# Patient Record
Sex: MALE | Race: WHITE | NOT HISPANIC OR LATINO | Employment: FULL TIME | URBAN - METROPOLITAN AREA
[De-identification: names, ages, dates, MRNs, and addresses within clinical notes are randomized per-mention and may not be internally consistent; named-entity substitution may affect disease eponyms.]

---

## 2017-08-14 ENCOUNTER — OFFICE VISIT (OUTPATIENT)
Dept: URGENT CARE | Facility: PHYSICIAN GROUP | Age: 47
End: 2017-08-14

## 2017-08-14 VITALS
OXYGEN SATURATION: 99 % | DIASTOLIC BLOOD PRESSURE: 86 MMHG | WEIGHT: 240 LBS | HEART RATE: 60 BPM | BODY MASS INDEX: 29.22 KG/M2 | SYSTOLIC BLOOD PRESSURE: 132 MMHG | RESPIRATION RATE: 16 BRPM | HEIGHT: 76 IN | TEMPERATURE: 98.2 F

## 2017-08-14 DIAGNOSIS — Z02.4 ENCOUNTER FOR CDL (COMMERCIAL DRIVING LICENSE) EXAM: ICD-10-CM

## 2017-08-14 PROCEDURE — 7100 PR DOT PHYSICAL: Performed by: PHYSICIAN ASSISTANT

## 2017-08-14 NOTE — PROGRESS NOTES
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   Pt. Passed HTN, urine, BP, and hearing. Currently passes examination. Please see scanned document.   Passed for 2 years .

## 2018-06-09 ENCOUNTER — APPOINTMENT (OUTPATIENT)
Dept: RADIOLOGY | Facility: MEDICAL CENTER | Age: 48
End: 2018-06-09
Attending: EMERGENCY MEDICINE
Payer: COMMERCIAL

## 2018-06-09 ENCOUNTER — HOSPITAL ENCOUNTER (EMERGENCY)
Facility: MEDICAL CENTER | Age: 48
End: 2018-06-09
Attending: EMERGENCY MEDICINE
Payer: COMMERCIAL

## 2018-06-09 ENCOUNTER — OFFICE VISIT (OUTPATIENT)
Dept: URGENT CARE | Facility: CLINIC | Age: 48
End: 2018-06-09
Payer: COMMERCIAL

## 2018-06-09 VITALS
BODY MASS INDEX: 28.43 KG/M2 | HEART RATE: 89 BPM | OXYGEN SATURATION: 91 % | RESPIRATION RATE: 16 BRPM | SYSTOLIC BLOOD PRESSURE: 130 MMHG | HEIGHT: 77 IN | TEMPERATURE: 102.1 F | DIASTOLIC BLOOD PRESSURE: 79 MMHG | WEIGHT: 240.74 LBS

## 2018-06-09 VITALS
HEIGHT: 75 IN | WEIGHT: 240.4 LBS | TEMPERATURE: 98.8 F | HEART RATE: 78 BPM | SYSTOLIC BLOOD PRESSURE: 120 MMHG | RESPIRATION RATE: 18 BRPM | OXYGEN SATURATION: 95 % | DIASTOLIC BLOOD PRESSURE: 78 MMHG | BODY MASS INDEX: 29.89 KG/M2

## 2018-06-09 DIAGNOSIS — J36 PERITONSILLAR ABSCESS: ICD-10-CM

## 2018-06-09 LAB
ALBUMIN SERPL BCP-MCNC: 4.3 G/DL (ref 3.2–4.9)
ALBUMIN/GLOB SERPL: 1.3 G/DL
ALP SERPL-CCNC: 62 U/L (ref 30–99)
ALT SERPL-CCNC: 33 U/L (ref 2–50)
ANION GAP SERPL CALC-SCNC: 8 MMOL/L (ref 0–11.9)
APTT PPP: 33 SEC (ref 24.7–36)
AST SERPL-CCNC: 28 U/L (ref 12–45)
BASOPHILS # BLD AUTO: 0.3 % (ref 0–1.8)
BASOPHILS # BLD: 0.03 K/UL (ref 0–0.12)
BILIRUB SERPL-MCNC: 0.9 MG/DL (ref 0.1–1.5)
BUN SERPL-MCNC: 14 MG/DL (ref 8–22)
CALCIUM SERPL-MCNC: 9.3 MG/DL (ref 8.5–10.5)
CHLORIDE SERPL-SCNC: 103 MMOL/L (ref 96–112)
CO2 SERPL-SCNC: 24 MMOL/L (ref 20–33)
CREAT SERPL-MCNC: 1.1 MG/DL (ref 0.5–1.4)
EOSINOPHIL # BLD AUTO: 0.01 K/UL (ref 0–0.51)
EOSINOPHIL NFR BLD: 0.1 % (ref 0–6.9)
ERYTHROCYTE [DISTWIDTH] IN BLOOD BY AUTOMATED COUNT: 40.1 FL (ref 35.9–50)
GLOBULIN SER CALC-MCNC: 3.2 G/DL (ref 1.9–3.5)
GLUCOSE SERPL-MCNC: 115 MG/DL (ref 65–99)
HCT VFR BLD AUTO: 40.7 % (ref 42–52)
HGB BLD-MCNC: 13.8 G/DL (ref 14–18)
IMM GRANULOCYTES # BLD AUTO: 0.04 K/UL (ref 0–0.11)
IMM GRANULOCYTES NFR BLD AUTO: 0.4 % (ref 0–0.9)
INR PPP: 1.12 (ref 0.87–1.13)
INT CON NEG: NEGATIVE
INT CON POS: POSITIVE
LACTATE BLD-SCNC: 0.7 MMOL/L (ref 0.5–2)
LYMPHOCYTES # BLD AUTO: 0.88 K/UL (ref 1–4.8)
LYMPHOCYTES NFR BLD: 7.8 % (ref 22–41)
MCH RBC QN AUTO: 29.6 PG (ref 27–33)
MCHC RBC AUTO-ENTMCNC: 33.9 G/DL (ref 33.7–35.3)
MCV RBC AUTO: 87.3 FL (ref 81.4–97.8)
MONOCYTES # BLD AUTO: 1.03 K/UL (ref 0–0.85)
MONOCYTES NFR BLD AUTO: 9.1 % (ref 0–13.4)
NEUTROPHILS # BLD AUTO: 9.28 K/UL (ref 1.82–7.42)
NEUTROPHILS NFR BLD: 82.3 % (ref 44–72)
NRBC # BLD AUTO: 0 K/UL
NRBC BLD-RTO: 0 /100 WBC
PLATELET # BLD AUTO: 188 K/UL (ref 164–446)
PMV BLD AUTO: 9.8 FL (ref 9–12.9)
POTASSIUM SERPL-SCNC: 3.8 MMOL/L (ref 3.6–5.5)
PROT SERPL-MCNC: 7.5 G/DL (ref 6–8.2)
PROTHROMBIN TIME: 14.1 SEC (ref 12–14.6)
RBC # BLD AUTO: 4.66 M/UL (ref 4.7–6.1)
S PYO AG THROAT QL: NORMAL
SODIUM SERPL-SCNC: 135 MMOL/L (ref 135–145)
WBC # BLD AUTO: 11.3 K/UL (ref 4.8–10.8)

## 2018-06-09 PROCEDURE — A9270 NON-COVERED ITEM OR SERVICE: HCPCS | Performed by: EMERGENCY MEDICINE

## 2018-06-09 PROCEDURE — 700111 HCHG RX REV CODE 636 W/ 250 OVERRIDE (IP): Performed by: EMERGENCY MEDICINE

## 2018-06-09 PROCEDURE — 85610 PROTHROMBIN TIME: CPT

## 2018-06-09 PROCEDURE — 80053 COMPREHEN METABOLIC PANEL: CPT

## 2018-06-09 PROCEDURE — 700117 HCHG RX CONTRAST REV CODE 255: Performed by: EMERGENCY MEDICINE

## 2018-06-09 PROCEDURE — 96365 THER/PROPH/DIAG IV INF INIT: CPT

## 2018-06-09 PROCEDURE — 303977 HCHG I & D

## 2018-06-09 PROCEDURE — 96375 TX/PRO/DX INJ NEW DRUG ADDON: CPT

## 2018-06-09 PROCEDURE — 87880 STREP A ASSAY W/OPTIC: CPT | Performed by: PHYSICIAN ASSISTANT

## 2018-06-09 PROCEDURE — 700101 HCHG RX REV CODE 250: Performed by: EMERGENCY MEDICINE

## 2018-06-09 PROCEDURE — 99222 1ST HOSP IP/OBS MODERATE 55: CPT | Performed by: HOSPITALIST

## 2018-06-09 PROCEDURE — 700105 HCHG RX REV CODE 258: Performed by: EMERGENCY MEDICINE

## 2018-06-09 PROCEDURE — 99203 OFFICE O/P NEW LOW 30 MIN: CPT | Performed by: PHYSICIAN ASSISTANT

## 2018-06-09 PROCEDURE — 304561 HCHG STAT O2

## 2018-06-09 PROCEDURE — 700102 HCHG RX REV CODE 250 W/ 637 OVERRIDE(OP): Performed by: EMERGENCY MEDICINE

## 2018-06-09 PROCEDURE — 70491 CT SOFT TISSUE NECK W/DYE: CPT

## 2018-06-09 PROCEDURE — 85730 THROMBOPLASTIN TIME PARTIAL: CPT

## 2018-06-09 PROCEDURE — 83605 ASSAY OF LACTIC ACID: CPT

## 2018-06-09 PROCEDURE — 85025 COMPLETE CBC W/AUTO DIFF WBC: CPT

## 2018-06-09 PROCEDURE — 99285 EMERGENCY DEPT VISIT HI MDM: CPT

## 2018-06-09 RX ORDER — AMOXICILLIN 250 MG
2 CAPSULE ORAL 2 TIMES DAILY
Status: DISCONTINUED | OUTPATIENT
Start: 2018-06-09 | End: 2018-06-09 | Stop reason: HOSPADM

## 2018-06-09 RX ORDER — LIDOCAINE HYDROCHLORIDE AND EPINEPHRINE 10; 10 MG/ML; UG/ML
10 INJECTION, SOLUTION INFILTRATION; PERINEURAL ONCE
Status: COMPLETED | OUTPATIENT
Start: 2018-06-09 | End: 2018-06-09

## 2018-06-09 RX ORDER — ONDANSETRON 4 MG/1
4 TABLET, ORALLY DISINTEGRATING ORAL EVERY 4 HOURS PRN
Status: DISCONTINUED | OUTPATIENT
Start: 2018-06-09 | End: 2018-06-09 | Stop reason: HOSPADM

## 2018-06-09 RX ORDER — PROMETHAZINE HYDROCHLORIDE 25 MG/1
12.5-25 TABLET ORAL EVERY 4 HOURS PRN
Status: DISCONTINUED | OUTPATIENT
Start: 2018-06-09 | End: 2018-06-09 | Stop reason: HOSPADM

## 2018-06-09 RX ORDER — HYDROCODONE BITARTRATE AND ACETAMINOPHEN 5; 325 MG/1; MG/1
1-2 TABLET ORAL EVERY 6 HOURS PRN
Qty: 15 TAB | Refills: 0 | Status: SHIPPED | OUTPATIENT
Start: 2018-06-09 | End: 2018-06-12

## 2018-06-09 RX ORDER — BISACODYL 10 MG
10 SUPPOSITORY, RECTAL RECTAL
Status: DISCONTINUED | OUTPATIENT
Start: 2018-06-09 | End: 2018-06-09 | Stop reason: HOSPADM

## 2018-06-09 RX ORDER — ACETAMINOPHEN 325 MG/1
650 TABLET ORAL ONCE
Status: COMPLETED | OUTPATIENT
Start: 2018-06-09 | End: 2018-06-09

## 2018-06-09 RX ORDER — AMOXICILLIN AND CLAVULANATE POTASSIUM 875; 125 MG/1; MG/1
1 TABLET, FILM COATED ORAL 2 TIMES DAILY
Qty: 20 TAB | Refills: 0 | Status: SHIPPED | OUTPATIENT
Start: 2018-06-09 | End: 2018-06-19

## 2018-06-09 RX ORDER — SODIUM CHLORIDE AND POTASSIUM CHLORIDE 150; 900 MG/100ML; MG/100ML
INJECTION, SOLUTION INTRAVENOUS CONTINUOUS
Status: DISCONTINUED | OUTPATIENT
Start: 2018-06-09 | End: 2018-06-09 | Stop reason: HOSPADM

## 2018-06-09 RX ORDER — POLYETHYLENE GLYCOL 3350 17 G/17G
1 POWDER, FOR SOLUTION ORAL
Status: DISCONTINUED | OUTPATIENT
Start: 2018-06-09 | End: 2018-06-09 | Stop reason: HOSPADM

## 2018-06-09 RX ORDER — DEXAMETHASONE SODIUM PHOSPHATE 10 MG/ML
10 INJECTION, SOLUTION INTRAMUSCULAR; INTRAVENOUS ONCE
Status: COMPLETED | OUTPATIENT
Start: 2018-06-09 | End: 2018-06-09

## 2018-06-09 RX ORDER — HYDROMORPHONE HYDROCHLORIDE 2 MG/ML
1 INJECTION, SOLUTION INTRAMUSCULAR; INTRAVENOUS; SUBCUTANEOUS
Status: DISCONTINUED | OUTPATIENT
Start: 2018-06-09 | End: 2018-06-09 | Stop reason: HOSPADM

## 2018-06-09 RX ORDER — PROMETHAZINE HYDROCHLORIDE 25 MG/1
12.5-25 SUPPOSITORY RECTAL EVERY 4 HOURS PRN
Status: DISCONTINUED | OUTPATIENT
Start: 2018-06-09 | End: 2018-06-09 | Stop reason: HOSPADM

## 2018-06-09 RX ORDER — ONDANSETRON 2 MG/ML
4 INJECTION INTRAMUSCULAR; INTRAVENOUS ONCE
Status: COMPLETED | OUTPATIENT
Start: 2018-06-09 | End: 2018-06-09

## 2018-06-09 RX ORDER — ONDANSETRON 2 MG/ML
4 INJECTION INTRAMUSCULAR; INTRAVENOUS EVERY 4 HOURS PRN
Status: DISCONTINUED | OUTPATIENT
Start: 2018-06-09 | End: 2018-06-09 | Stop reason: HOSPADM

## 2018-06-09 RX ORDER — IBUPROFEN 200 MG
400-600 TABLET ORAL EVERY 6 HOURS PRN
Status: SHIPPED | COMMUNITY
End: 2021-07-20

## 2018-06-09 RX ORDER — ACETAMINOPHEN 325 MG/1
650 TABLET ORAL EVERY 6 HOURS PRN
Status: DISCONTINUED | OUTPATIENT
Start: 2018-06-09 | End: 2018-06-09 | Stop reason: HOSPADM

## 2018-06-09 RX ORDER — MORPHINE SULFATE 4 MG/ML
4 INJECTION, SOLUTION INTRAMUSCULAR; INTRAVENOUS ONCE
Status: COMPLETED | OUTPATIENT
Start: 2018-06-09 | End: 2018-06-09

## 2018-06-09 RX ORDER — SODIUM CHLORIDE 9 MG/ML
1000 INJECTION, SOLUTION INTRAVENOUS CONTINUOUS
Status: DISCONTINUED | OUTPATIENT
Start: 2018-06-09 | End: 2018-06-09

## 2018-06-09 RX ORDER — OXYCODONE HYDROCHLORIDE 5 MG/1
5-10 TABLET ORAL
Status: DISCONTINUED | OUTPATIENT
Start: 2018-06-09 | End: 2018-06-09 | Stop reason: HOSPADM

## 2018-06-09 RX ADMIN — ACETAMINOPHEN 650 MG: 325 TABLET, FILM COATED ORAL at 16:13

## 2018-06-09 RX ADMIN — ONDANSETRON 4 MG: 2 INJECTION INTRAMUSCULAR; INTRAVENOUS at 12:06

## 2018-06-09 RX ADMIN — DEXAMETHASONE SODIUM PHOSPHATE 10 MG: 10 INJECTION, SOLUTION INTRAMUSCULAR; INTRAVENOUS at 12:25

## 2018-06-09 RX ADMIN — LIDOCAINE HYDROCHLORIDE,EPINEPHRINE BITARTRATE 10 ML: 10; .01 INJECTION, SOLUTION INFILTRATION; PERINEURAL at 14:58

## 2018-06-09 RX ADMIN — MORPHINE SULFATE 4 MG: 4 INJECTION INTRAVENOUS at 12:06

## 2018-06-09 RX ADMIN — IOHEXOL 100 ML: 350 INJECTION, SOLUTION INTRAVENOUS at 12:48

## 2018-06-09 RX ADMIN — AMPICILLIN AND SULBACTAM 3 G: 2; 1 INJECTION, POWDER, FOR SOLUTION INTRAVENOUS at 12:03

## 2018-06-09 RX ADMIN — SODIUM CHLORIDE 1000 ML: 9 INJECTION, SOLUTION INTRAVENOUS at 12:03

## 2018-06-09 ASSESSMENT — ENCOUNTER SYMPTOMS
WHEEZING: 0
EYE REDNESS: 0
FEVER: 0
CHILLS: 1
FEVER: 1
PALPITATIONS: 0
STRIDOR: 0
COUGH: 1
STRIDOR: 0
HEADACHES: 0
SORE THROAT: 1
CHILLS: 0
SPUTUM PRODUCTION: 0
VOMITING: 0
SORE THROAT: 1
EYE PAIN: 0
HEMOPTYSIS: 0
EYE DISCHARGE: 0
SHORTNESS OF BREATH: 0
SHORTNESS OF BREATH: 0

## 2018-06-09 ASSESSMENT — LIFESTYLE VARIABLES: DO YOU DRINK ALCOHOL: NO

## 2018-06-09 NOTE — ED TRIAGE NOTES
Chief Complaint   Patient presents with   • Sent from Urgent Care     for peritonsillar absces   • Ear Pain     r>L     Pt ambulated to triage , sore throat and earpain x1wk. Speaking full sentences, no drooling noted.   received report from .

## 2018-06-09 NOTE — ED NOTES
Pt ambulates to 19 WILBERT in NAD.  Pt reports sore throat and left ear pain x 1 week.  Pt was seen at  and sent here for further evaluation and treatment of peritonsillar abscess.  No signs of airway compromise, speaking full sentences.

## 2018-06-09 NOTE — ED NOTES
Pt provided with discharge instructions, prescriptions x2, instructions for follow up appointment with ENT, s/s of when to seek emergency care.  Pt verbalizes understanding.  Pt discharged in good condition.  Pt instructed not to operate vehicle or drink ETOH on narcotic pain medication.  Pt has family picking him up from ER.

## 2018-06-09 NOTE — ED PROVIDER NOTES
"ED Provider Note    CHIEF COMPLAINT  Chief Complaint   Patient presents with   • Sent from Urgent Care     for peritonsillar absces   • Ear Pain     r>L       HPI  Daron Justice is a 47 y.o. male who presents contrast from the urgent care with complaints of a sore throat and difficulty swallowing for the past one week.  The patient denied any significant fever or shaking chills. He has had no runny nose, cough, congestion, or any different breathing. He notes over the past several days the pain has been worsening and he is having a sharp pain in his right ear.  He went to the urgent care today, was referred to the emergency department for a possible peritonsillar abscess.  The patient has been tolerating food and fluids, denies any nausea, or vomiting.    REVIEW OF SYSTEMS  See HPI for further details. All other systems are negative.     PAST MEDICAL HISTORY  History reviewed. No pertinent past medical history.    FAMILY HISTORY  No family history on file.    SOCIAL HISTORY  Social History     Social History   • Marital status: Single     Spouse name: N/A   • Number of children: N/A   • Years of education: N/A     Social History Main Topics   • Smoking status: Never Smoker   • Smokeless tobacco: Never Used   • Alcohol use Yes   • Drug use: No   • Sexual activity: Not on file     Other Topics Concern   • Not on file     Social History Narrative   • No narrative on file       SURGICAL HISTORY  History reviewed. No pertinent surgical history.    CURRENT MEDICATIONS  Home Medications     Reviewed by Melissa White R.N. (Registered Nurse) on 06/09/18 at 1113  Med List Status: Complete   Medication Last Dose Status   hydrocodone-acetaminophen (NORCO) 5-325 MG TABS per tablet not taking Active                ALLERGIES  No Known Allergies    PHYSICAL EXAM  VITAL SIGNS: /79   Pulse 74   Temp 37.4 °C (99.4 °F)   Resp 16   Ht 1.956 m (6' 5\")   Wt 109.2 kg (240 lb 11.9 oz)   SpO2 99%   BMI 28.55 kg/m² "   Constitutional: Awake, alert, in no acute distress, Non-toxic appearance. Voice is slightly hoarse.  HENT: Atraumatic. Bilateral external ears normal, TMs show cerumen in both external canals. The left TM appears nonerythematous, the upper half of the right TM is visualized and does not appear erythematous. Mucous membranes mildly dry, throat is moderately erythematous, with moderate swelling to the right peritonsillar bed and soft palate, nose is normal.  Eyes: PERRL, EOMI, conjunctiva moist, noninjected.  Neck: Nontender, Normal range of motion, No nuchal rigidity, No stridor.   Lymphatic: No lymphadenopathy noted.   Cardiovascular: Regular rate and rhythm, no murmurs, rubs, gallops.  Thorax & Lungs:  Good breath sounds bilaterally, no wheezes, rales, or retractions.  No chest tenderness.  Abdomen: Bowel sounds normal, Soft, nontender, nondistended, no rebound, guarding, masses.  Back: No CVA or spinal tenderness.  Extremities: Intact distal pulses, No edema, No tenderness.   Skin: Warm, Dry, No rashes.   Musculoskeletal: No joint swelling or tenderness.  Neurologic: Alert & oriented x 3, sensory and motor function normal. No focal deficits.   Psychiatric: Affect normal, Judgment normal, Mood normal.         RADIOLOGY/PROCEDURES  CT-SOFT TISSUE NECK WITH   Final Result         1. A 1.7 x 1.8 x 1.9 cm right peritonsillar abscess with surrounding heterogeneous tonsillar tissue.      2. Extensive inflammatory change extending from the right base of the tongue to the glottis.      3. Reactive enlargement of the right level 2 lymph nodes.            COURSE & MEDICAL DECISION MAKING  Pertinent Labs & Imaging studies reviewed. (See chart for details)  The patient presents with above complaints. On examination he does appear to have a right-sided peritonsillar abscess. IV is placed, he was given a bolus of normal saline as he has dry mucous membranes, and is made nothing by mouth for possible surgery.  He was given  decadron 10 mg IV, and Unasyn 3 g IV PB, morphine and Zofran for pain. CBC shows a white count 11,300, 82% polys, chemistry glucose 115, otherwise normal, INR 1.12. CT scan soft tissue neck with IV contrast was obtained which showed a right peritonsillar abscess. Dr. Rubio of otolaryngology was consulted and was in the see the patient. He did a bedside drainage of the abscess with immediate improvement of the patient's symptoms.  At this time he feels the patient can be discharged home and he will see the patient in his office on Monday. Patient will be placed on Augmentin 875 mg twice a day ×10 days, and Norco for pain. Patient is to return to the ER for any worsened symptoms, difficulty breathing or swallowing, vomiting, or any other problems.     The initial plan was to discharge the patient.  After getting up and going to the bathroom, the patient developed shaking chills.  He was noted to be febrile with a temperature 101.9. He was given a dose of Tylenol. I have recommended admission to the hospital. The patient declines and says he would just like to wait and see how he feels in about 15 minutes. On recheck he continued be febrile with a temperature 102.1, but his chills had resolved, and he says he was feeling much better. He was tolerating oral fluids. I again recommended he stay in the hospital, but the patient declines. He understands that the chills might be a sign of early sepsis or an infection in his bloodstream, which could lead to severe disability or even death. He continues to state he does not want to stay in the  Hospital, and that he will return if he has any worsening symptoms.  Patient is told to follow up with Dr. Rubio on Monday, return to the ER for worsening symptoms, difficulty breathing, vomiting, continued high fever, or any other problems, or any changes his mind about admission.      FINAL IMPRESSION  1. Right peritonsillar abscess  2.   3.         Electronically signed by: Matt GALINDO  Navi, 6/9/2018 1:17 PM

## 2018-06-09 NOTE — ED NOTES
Pt reports he would like to go home and he feels much better.  Oral temperature rechecked and 102.1F.  ERP updated.

## 2018-06-09 NOTE — PATIENT INSTRUCTIONS
Peritonsillar Abscess  Introduction  A peritonsillar abscess is a collection of yellowish-white fluid (pus) in the back of the throat behind the tonsils. It usually occurs when an infection of the throat or tonsils (tonsillitis) spreads into the tissues around the tonsils.  What are the causes?  The infection that leads to a peritonsillar abscess is usually caused by streptococcal bacteria.  What are the signs or symptoms?  · Sore throat, often with pain on just one side.  · Swelling and tenderness of the glands (lymph nodes) in the neck.  · Difficulty swallowing.  · Difficulty opening your mouth.  · Fever.  · Chills.  · Drooling because of difficulty swallowing saliva.  · Headache.  · Changes in your voice.  · Bad breath.  How is this diagnosed?  Your health care provider will take your medical history and do a physical exam. Imaging tests may be done, such as an ultrasound or CT scan. A sample of pus may be removed from the abscess using a needle (needle aspiration) or by swabbing the back of your throat. This sample will be sent to a lab for testing.  How is this treated?  Treatment usually involves draining the pus from the abscess. This may be done through needle aspiration or by making an incision in the abscess. You will also likely need to take antibiotic medicine.  Follow these instructions at home:  · Rest as much as possible and get plenty of sleep.  · Take medicines only as directed by your health care provider.  · If you were prescribed an antibiotic medicine, finish it all even if you start to feel better.  · If your abscess was drained by your health care provider, gargle with a mixture of salt and warm water:  ¨ Mix 1 tsp of salt in 8 oz of warm water.  ¨ Gargle with this mixture four times per day or as needed for comfort.  ¨ Do not swallow this mixture.  · Drink plenty of fluids.  · While your throat is sore, eat soft or liquid foods, such as frozen ice pops and ice cream.  · Keep all follow-up  visits as directed by your health care provider. This is important.  Contact a health care provider if:  · You have increased pain, swelling, redness, or drainage in your throat.  · You develop a headache, a lack of energy (lethargy), or generalized feelings of illness.  · You have a fever.  · You feel dizzy.  · You have difficulty swallowing or eating.  · You show signs of becoming dehydrated, such as:  ¨ Light-headedness when standing.  ¨ Decreased urine output.  ¨ A fast heart rate.  ¨ Dry mouth.  Get help right away if:  · You have difficulty talking or breathing, or you find it easier to breathe when you lean forward.  · You are coughing up blood or vomiting blood.  · You have severe throat pain that is not helped by medicines.  · You start to drool.  This information is not intended to replace advice given to you by your health care provider. Make sure you discuss any questions you have with your health care provider.  Document Released: 12/18/2006 Document Revised: 05/25/2017 Document Reviewed: 08/03/2015  © 2017 [employer]

## 2018-06-09 NOTE — ED NOTES
Pt ambulated to the bathroom and then started having whole body vigorous shivering.  Temp 101.9 oral.  ERP at bedside for re-eval.

## 2018-06-09 NOTE — OP REPORT
DATE OF SERVICE:  06/09/2018    :  Dl Rubio MD    PREOPERATIVE DIAGNOSIS:  Right peritonsillar abscess.    POSTOPERATIVE DIAGNOSIS:  Right peritonsillar abscess.    OPERATION PERFORMED:  I and D of right peritonsillar abscess.    FINDINGS:  Patient had a huge amount of pus in the right retro-tonsillar   region, which was foul smelling.    DESCRIPTION OF PROCEDURE:  The patient was placed on the treatment table.  The   throat was sprayed with 10% Xylocaine spray.  The throat was then injected   with 2% Xylocaine with adrenalin.  The incision was then made in the superior   pole of the right tonsil with a #11 blade.  A large hemostat was then placed   back through ____ into the abscess, which was evacuated thoroughly.  He had a   little bit oozing, which had stopped within 5 minutes.    We will place the patient on Norco and Augmentin and see him back in the   office on Monday for followup.       ____________________________________     MD RHINA Cristobal / KRISTOPHER    DD:  06/09/2018 15:07:50  DT:  06/09/2018 15:33:06    D#:  [id number]  Job#:  [id number]

## 2018-06-09 NOTE — PROGRESS NOTES
"Subjective:      Daron Justice is a 47 y.o. male who presents with Pharyngitis (and ear pain x 1 week, feverish )            Pharyngitis    This is a new problem. The current episode started in the past 7 days. The problem has been rapidly worsening. The pain is severe. Associated symptoms include congestion, coughing and ear pain. Pertinent negatives include no ear discharge, headaches, shortness of breath or stridor. He has tried nothing for the symptoms.       Review of Systems   Constitutional: Positive for malaise/fatigue. Negative for chills and fever.   HENT: Positive for congestion, ear pain and sore throat. Negative for ear discharge.    Eyes: Negative for pain, discharge and redness.   Respiratory: Positive for cough. Negative for hemoptysis, sputum production, shortness of breath, wheezing and stridor.    Cardiovascular: Negative for chest pain and palpitations.   Skin: Negative for itching and rash.   Neurological: Negative for headaches.   All other systems reviewed and are negative.    PMH:  has no past medical history on file.  MEDS:   Current Outpatient Prescriptions:   •  hydrocodone-acetaminophen (NORCO) 5-325 MG TABS per tablet, Take 1-2 Tabs by mouth every 6 hours as needed., Disp: 20 Tab, Rfl: 0  ALLERGIES: No Known Allergies  SURGHX: No past surgical history on file.  SOCHX:  reports that he has never smoked. He has never used smokeless tobacco. He reports that he drinks alcohol. He reports that he does not use drugs.  FH: Family history was reviewed, no pertinent findings to report\Medications, Allergies, and current problem list reviewed today in Epic       Objective:     /78   Pulse 78   Temp 37.1 °C (98.8 °F)   Resp 18   Ht 1.905 m (6' 3\")   Wt 109 kg (240 lb 6.4 oz)   SpO2 95%   BMI 30.05 kg/m²      Physical Exam   Constitutional: He is oriented to person, place, and time. He appears well-developed and well-nourished.  Non-toxic appearance. He does not have a sickly appearance. " He does not appear ill. No distress.   HENT:   Head: Normocephalic and atraumatic.   Right Ear: Hearing, tympanic membrane, external ear and ear canal normal.   Left Ear: Hearing, tympanic membrane, external ear and ear canal normal.   Nose: Nose normal.   Mouth/Throat: Mucous membranes are normal. Posterior oropharyngeal edema, posterior oropharyngeal erythema and tonsillar abscesses present. No oropharyngeal exudate.       Neck: Normal range of motion. Neck supple. No JVD present. No tracheal deviation present. No thyromegaly present.   Cardiovascular: Regular rhythm and normal heart sounds.  Exam reveals no gallop and no friction rub.    No murmur heard.  Pulmonary/Chest: Effort normal and breath sounds normal. No stridor. No respiratory distress. He has no wheezes. He has no rales. He exhibits no tenderness.   Lymphadenopathy:     He has cervical adenopathy.   Neurological: He is alert and oriented to person, place, and time.   Skin: Skin is warm and dry.   Psychiatric: He has a normal mood and affect. His behavior is normal. Judgment and thought content normal.   Vitals reviewed.           Rapid Strep: NEG  Assessment/Plan:   Pt is a 47 -year-old male who presents with sore throat and ear pain for one week. States that he feels feverish. On exam it appears he has a probable peritonsillar abscess on the right side. His uvula is deviated to the left. He is referred to the emergency department for further evaluation and possible drainage. Dr. Montalvo was given report and is expecting him by private vehicle.    1. Peritonsillar abscess  - Transfer to ED  - POCT Rapid Strep A    Differential diagnosis, natural history, supportive care discussed. Follow-up with primary care provider within 7-10 days, emergency room precautions discussed.  Patient and/or family appears understanding of information.  Handout and review of patients diagnosis and treatment was discussed extensively.

## 2018-06-09 NOTE — ED NOTES
The Medication Reconciliation process has been completed by interviewing the patient    Allergies have been reviewed  Antibiotic use in 30 days - none    Home Pharmacy:  [facility]

## 2018-06-09 NOTE — CONSULTS
DATE OF SERVICE:  06/09/2018    REASON FOR CONSULTATION:  Peritonsillar abscess, right side.    SUBJECTIVE:  The patient is a 47-year-old male, who has not had a previous   history of tonsillitis.  Over the last week, he developed a slowly progressing   right-sided sore throat, which got much worse over the last 24-36 hours.    PHYSICAL EXAMINATION:  Reveals peritonsillar swelling on the right side, the   tonsils huge with exudates.  Neck is supple without adenopathy.    DATA:  CT scan reveals about a 2 cm abscess in the peritonsillar area on the   right side.    RECOMMENDATIONS:  We will proceed with I and D.       ____________________________________     Bud MD RHINA Trimble / KRISTOPHER    DD:  06/09/2018 15:05:55  DT:  06/09/2018 15:49:16    D#:  [id number]  Job#:  [id number]

## 2018-06-09 NOTE — H&P
Hospital Medicine History and Physical    Date of Service  2018    Chief Complaint  Chief Complaint   Patient presents with   • Sent from Urgent Care     for peritonsillar absces   • Ear Pain     r>L       History of Presenting Illness  47 y.o. male who presented 2018 with throat pain.  Mr. Justice has no known medical conditions that developed throat pain a week ago. He has had fevers and chills with progressive right-sided pain. He has also noted swelling of the right neck lymph nodes. He presented to urgent care today where he was appropriately referred to the ER due to a right peritonsillar abscess. Here in the ER, he has been started on IV antibiotics and will be admitted for surgical evaluation by Dr. Rubio. Mr. Justice notes that he has had trouble swallowing due to the pain and swelling. He has never had symptoms this severe in the past.  Primary Care Physician  Pcp Pt States None    Consultants  Dr. Rubio, ENT    Code Status  full    Review of Systems  Review of Systems   Constitutional: Positive for chills and fever.   HENT: Positive for sore throat.    Respiratory: Negative for shortness of breath and stridor.    Cardiovascular: Negative for chest pain.   Gastrointestinal: Negative for vomiting.   All other systems reviewed and are negative.       Past Medical History  No past medical history on file.  none  Surgical History  No past surgical history on file.  Deviated septum   Medications  No current facility-administered medications on file prior to encounter.      No current outpatient prescriptions on file prior to encounter.     none  Family History  No family history on file.  Brother has hypertension  Social History  Social History   Substance Use Topics   • Smoking status: Never Smoker   • Smokeless tobacco: Never Used   • Alcohol use Yes       Allergies  No Known Allergies     Physical Exam  Laboratory   Hemodynamics  Temp (24hrs), Av.4 °C (99.4 °F), Min:37.4 °C (99.4 °F), Max:37.4 °C  (99.4 °F)   Temperature: 37.4 °C (99.4 °F)  Pulse  Av  Min: 69  Max: 79 Heart Rate (Monitored): 71  Blood Pressure: 130/79, NIBP: 127/59      Respiratory      Respiration: 16, Pulse Oximetry: 96 %             Physical Exam   Constitutional: He is oriented to person, place, and time. No distress.   HENT:   Large right tonsil with pus  Right lateral to tonsil is markedly swollen   Neck:   Right cervical adenopathy   supple   Cardiovascular: Normal rate and regular rhythm.    No murmur heard.  Pulmonary/Chest: Effort normal. No respiratory distress. He has no wheezes.   Abdominal: Soft. He exhibits no distension.   Musculoskeletal: He exhibits no edema or tenderness.   Neurological: He is alert and oriented to person, place, and time.   Skin: Skin is warm and dry. He is not diaphoretic.   Psychiatric: He has a normal mood and affect. His behavior is normal.   Nursing note and vitals reviewed.      Recent Labs      18   1158   WBC  11.3*   RBC  4.66*   HEMOGLOBIN  13.8*   HEMATOCRIT  40.7*   MCV  87.3   MCH  29.6   MCHC  33.9   RDW  40.1   PLATELETCT  188   MPV  9.8     Recent Labs      18   1158   SODIUM  135   POTASSIUM  3.8   CHLORIDE  103   CO2  24   GLUCOSE  115*   BUN  14   CREATININE  1.10   CALCIUM  9.3     Recent Labs      18   1158   ALTSGPT  33   ASTSGOT  28   ALKPHOSPHAT  62   TBILIRUBIN  0.9   GLUCOSE  115*     Recent Labs      18   1158   APTT  33.0   INR  1.12             No results found for: TROPONINI  Urinalysis:  No results found for: SPECGRAVITY, GLUCOSEUR, KETONES, NITRITE, WBCURINE, RBCURINE, BACTERIA, EPITHELCELL     Imaging  CT-SOFT TISSUE NECK WITH   Final Result         1. A 1.7 x 1.8 x 1.9 cm right peritonsillar abscess with surrounding heterogeneous tonsillar tissue.      2. Extensive inflammatory change extending from the right base of the tongue to the glottis.      3. Reactive enlargement of the right level 2 lymph nodes.           Assessment/Plan     I  anticipated this patient would require inpatient status given his leukocytosis, CT findings of an abscess, fever/chills, and overall condition.   He was admitted for fluids and IV antibiotics.   After Dr. Rubio came in and was able to successfully drain the abscess, he is comfortable allowing Dr. Justice to be discharged home on oral augmentin. Thus, he recovered much more quickly than I anticipated.     Peritonsillar abscess- (present on admission)   Assessment & Plan    Noted on CT of the head/neck.  IV unasyn ordered.  Dr. Rubio, ENT, consulted for drainage.   WBC 11.3   Admit to medical and home when okay with Dr. Rubio on oral augmentin.             VTE prophylaxis: SCD

## 2018-06-10 NOTE — ED NOTES
Pt ok for discharge per ERP Iida.  Pt updated on discharge plan and s/s of when to return to ER.  Pt ambulated out of ER and has a ride home.

## 2019-01-22 ENCOUNTER — APPOINTMENT (OUTPATIENT)
Dept: RADIOLOGY | Facility: MEDICAL CENTER | Age: 49
End: 2019-01-22
Attending: EMERGENCY MEDICINE
Payer: COMMERCIAL

## 2019-01-22 ENCOUNTER — HOSPITAL ENCOUNTER (OUTPATIENT)
Dept: RADIOLOGY | Facility: MEDICAL CENTER | Age: 49
End: 2019-01-22
Attending: FAMILY MEDICINE
Payer: COMMERCIAL

## 2019-01-22 ENCOUNTER — HOSPITAL ENCOUNTER (EMERGENCY)
Facility: MEDICAL CENTER | Age: 49
End: 2019-01-22
Attending: EMERGENCY MEDICINE
Payer: COMMERCIAL

## 2019-01-22 ENCOUNTER — OCCUPATIONAL MEDICINE (OUTPATIENT)
Dept: URGENT CARE | Facility: PHYSICIAN GROUP | Age: 49
End: 2019-01-22
Payer: COMMERCIAL

## 2019-01-22 VITALS
HEART RATE: 84 BPM | DIASTOLIC BLOOD PRESSURE: 92 MMHG | SYSTOLIC BLOOD PRESSURE: 154 MMHG | BODY MASS INDEX: 27.75 KG/M2 | WEIGHT: 235 LBS | HEIGHT: 77 IN | RESPIRATION RATE: 18 BRPM | TEMPERATURE: 99.3 F | OXYGEN SATURATION: 97 %

## 2019-01-22 VITALS
HEART RATE: 71 BPM | RESPIRATION RATE: 16 BRPM | SYSTOLIC BLOOD PRESSURE: 127 MMHG | DIASTOLIC BLOOD PRESSURE: 76 MMHG | OXYGEN SATURATION: 97 % | WEIGHT: 253 LBS | BODY MASS INDEX: 29.87 KG/M2 | TEMPERATURE: 98.5 F | HEIGHT: 77 IN

## 2019-01-22 DIAGNOSIS — M25.571 ACUTE RIGHT ANKLE PAIN: ICD-10-CM

## 2019-01-22 DIAGNOSIS — S82.891A CLOSED FRACTURE OF RIGHT ANKLE, INITIAL ENCOUNTER: ICD-10-CM

## 2019-01-22 PROCEDURE — 99215 OFFICE O/P EST HI 40 MIN: CPT | Performed by: FAMILY MEDICINE

## 2019-01-22 PROCEDURE — 73590 X-RAY EXAM OF LOWER LEG: CPT | Mod: RT

## 2019-01-22 PROCEDURE — 700111 HCHG RX REV CODE 636 W/ 250 OVERRIDE (IP): Performed by: EMERGENCY MEDICINE

## 2019-01-22 PROCEDURE — 302875 HCHG BANDAGE ACE 4 OR 6""

## 2019-01-22 PROCEDURE — 29505 APPLICATION LONG LEG SPLINT: CPT

## 2019-01-22 PROCEDURE — 99285 EMERGENCY DEPT VISIT HI MDM: CPT

## 2019-01-22 PROCEDURE — A9270 NON-COVERED ITEM OR SERVICE: HCPCS | Performed by: EMERGENCY MEDICINE

## 2019-01-22 PROCEDURE — 73610 X-RAY EXAM OF ANKLE: CPT | Mod: RT

## 2019-01-22 PROCEDURE — 700102 HCHG RX REV CODE 250 W/ 637 OVERRIDE(OP): Performed by: EMERGENCY MEDICINE

## 2019-01-22 PROCEDURE — 96374 THER/PROPH/DIAG INJ IV PUSH: CPT

## 2019-01-22 PROCEDURE — 304561 HCHG STAT O2

## 2019-01-22 PROCEDURE — 27840 TREAT ANKLE DISLOCATION: CPT

## 2019-01-22 PROCEDURE — 302874 HCHG BANDAGE ACE 2 OR 3""

## 2019-01-22 RX ORDER — OXYCODONE HYDROCHLORIDE AND ACETAMINOPHEN 5; 325 MG/1; MG/1
1-2 TABLET ORAL EVERY 4 HOURS PRN
Qty: 15 TAB | Refills: 0 | Status: SHIPPED | OUTPATIENT
Start: 2019-01-22 | End: 2019-01-25

## 2019-01-22 RX ORDER — HYDROCODONE BITARTRATE AND ACETAMINOPHEN 5; 325 MG/1; MG/1
1 TABLET ORAL ONCE
Status: COMPLETED | OUTPATIENT
Start: 2019-01-22 | End: 2019-01-22

## 2019-01-22 RX ORDER — PROPOFOL 10 MG/ML
INJECTION, EMULSION INTRAVENOUS
Status: COMPLETED | OUTPATIENT
Start: 2019-01-22 | End: 2019-01-22

## 2019-01-22 RX ADMIN — HYDROCODONE BITARTRATE AND ACETAMINOPHEN 1 TABLET: 5; 325 TABLET ORAL at 22:42

## 2019-01-22 RX ADMIN — PROPOFOL 80 MG: 10 INJECTION, EMULSION INTRAVENOUS at 20:45

## 2019-01-22 ASSESSMENT — LIFESTYLE VARIABLES
ON A TYPICAL DAY WHEN YOU DRINK ALCOHOL HOW MANY DRINKS DO YOU HAVE: 2
HAVE PEOPLE ANNOYED YOU BY CRITICIZING YOUR DRINKING: NO
CONSUMPTION TOTAL: NEGATIVE
TOTAL SCORE: 0
HOW MANY TIMES IN THE PAST YEAR HAVE YOU HAD 5 OR MORE DRINKS IN A DAY: 0
HAVE YOU EVER FELT YOU SHOULD CUT DOWN ON YOUR DRINKING: NO
EVER HAD A DRINK FIRST THING IN THE MORNING TO STEADY YOUR NERVES TO GET RID OF A HANGOVER: NO
TOTAL SCORE: 0
EVER FELT BAD OR GUILTY ABOUT YOUR DRINKING: NO
AVERAGE NUMBER OF DAYS PER WEEK YOU HAVE A DRINK CONTAINING ALCOHOL: 1
DO YOU DRINK ALCOHOL: YES
TOTAL SCORE: 0

## 2019-01-22 ASSESSMENT — PAIN SCALES - GENERAL: PAINLEVEL_OUTOF10: 3

## 2019-01-22 NOTE — LETTER
"  FORM C-4:  EMPLOYEE’S CLAIM FOR COMPENSATION/ REPORT OF INITIAL TREATMENT  EMPLOYEE’S CLAIM - PROVIDE ALL INFORMATION REQUESTED   First Name  Daron Last Name  Reshma Birthdate             Age  1970 48 y.o. 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]   What were you doing at the time of accident? (if applicable)  Walking [address]     How did this injury or occupational disease occur? Be specific and answer in detail. Use additional sheet if necessary)  Slipped on icy driveway    If you believe that you have an occupational disease, when did you first have knowledge of the disability and it relationship to your employment?  n/a Witnesses to the Accident  n/a     Nature of Injury or Occupational Disease  Workers' Compensation  Part(s) of Body Injured or Affected  Foot (R), N/A, N/A    I certify that the above is true and correct to the best of my knowledge and that I have provided this information in order to obtain the benefits of [place] inclusive or " Chapter 617 of [place]RS).  I hereby authorize any physician, chiropractor, surgeon, practitioner, or other person, any hospital, including [facility] hospital, any medical service organization, any insurance company, or other institution or organization to release to each other, any medical or other information, including benefits paid or payable, pertinent to this injury or disease, except information relative to diagnosis, treatment and/or counseling for AIDS, psychological conditions, alcohol or controlled substances, for which I must give specific authorization.  A Photostat of this authorization shall be as valid as the original.   Date  1- [facility] Employee’s Signature   THIS REPORT MUST BE COMPLETED AND MAILED WITHIN 3 WORKING DAYS OF TREATMENT   Place  [facility], EMERGENCY DEPT  Name of Facility   [facility]   Date  1/22/2019 Diagnosis  (S82.891A) Closed fracture of right ankle, initial encounter Is there evidence the injured employee was under the influence of alcohol and/or another controlled substance at the time of accident?   Hour  10:15 PM Description of Injury or Disease  Closed fracture of right ankle, initial encounter     Treatment     Have you advised the patient to remain off work five days or more?             X-Ray Findings      If Yes   From Date    To Date      From information given by the employee, together with medical evidence, can you directly connect this injury or occupational disease as job incurred?    If No, is the employee capable of: Full Duty    Modified Duty      Is additional medical care by a physician indicated?    If Modified Duty, Specify any Limitations / Restrictions        Do you know of any previous injury or disease contributing to this condition or occupational disease?      Date  1/22/2019 Print Doctor’s Name  Bairon Sharif I certify the employer’s copy of this  "form was mailed on:   Address  [address]  [place] [id number] [phone] Insurer’s Use Only   [facility]  [address]    Provider’s Tax ID Number  [id number] Telephone  Dept: [phone]    Doctor’s Signature    Degree       Original - TREATING PHYSICIAN OR CHIROPRACTOR   Pg 2-Insurer/TPA   Pg 3-Employer   Pg 4-Employee                                                                                                  Form C-4 (rev01/03)     BRIEF DESCRIPTION OF RIGHTS AND BENEFITS  (Pursuant to [place]RS 616C.050)    Notice of Injury or Occupational Disease (Incident Report Form C-1): If an injury or occupational disease (OD) arises out of and in the course of employment, you must provide written notice to your employer as soon as practicable, but no later than 7 days after the accident or OD. Your employer shall maintain a sufficient supply of the required forms.    Claim for Compensation (Form C-4): If medical treatment is sought, the form C-4 is available at the place of initial treatment. A completed \"Claim for Compensation\" (Form C-4) must be filed within 90 days after an accident or OD. The treating physician or chiropractor must, within 3 working days after treatment, complete and mail to the employer, the employer's insurer and third-party , the Claim for Compensation.    Medical Treatment: If you require medical treatment for your on-the-job injury or OD, you may be required to select a physician or chiropractor from a list provided by your workers’ compensation insurer, if it has contracted with an Organization for Managed Care (MCO) or Preferred Provider Organization (PPO) or providers of health care. If your employer has not entered into a contract with an MCO or PPO, you may select a physician or chiropractor from the Panel of Physicians and Chiropractors. Any medical costs related to your industrial injury or OD will be paid by your insurer.    Temporary Total Disability " (TTD): If your doctor has certified that you are unable to work for a period of at least 5 consecutive days, or 5 cumulative days in a 20-day period, or places restrictions on you that your employer does not accommodate, you may be entitled to TTD compensation.    Temporary Partial Disability (TPD): If the wage you receive upon reemployment is less than the compensation for TTD to which you are entitled, the insurer may be required to pay you TPD compensation to make up the difference. TPD can only be paid for a maximum of 24 months.    Permanent Partial Disability (PPD): When your medical condition is stable and there is an indication of a PPD as a result of your injury or OD, within 30 days, your insurer must arrange for an evaluation by a rating physician or chiropractor to determine the degree of your PPD. The amount of your PPD award depends on the date of injury, the results of the PPD evaluation and your age and wage.    Permanent Total Disability (PTD): If you are medically certified by a treating physician or chiropractor as permanently and totally disabled and have been granted a PTD status by your insurer, you are entitled to receive monthly benefits not to exceed 66 2/3% of your average monthly wage. The amount of your PTD payments is subject to reduction if you previously received a PPD award.    Vocational Rehabilitation Services: You may be eligible for vocational rehabilitation services if you are unable to return to the job due to a permanent physical impairment or permanent restrictions as a result of your injury or occupational disease.    Transportation and Per Tika Reimbursement: You may be eligible for travel expenses and per tika associated with medical treatment.  Reopening: You may be able to reopen your claim if your condition worsens after claim closure.    Appeal Process: If you disagree with a written determination issued by the insurer or the insurer does not respond to your request,  you may appeal to the Department of Administration, , by following the instructions contained in your determination letter. You must appeal the determination within 70 days from the date of the determination letter at [address], [address], [place], [place] [address], or [address], [address], [place], [place] [address]. If you disagree with the  decision, you may appeal to the Department of Administration, . You must file your appeal within 30 days from the date of the  decision letter at [address], [address], [place], [place] [address], or [address], [address], [place], [place] [address]. If you disagree with a decision of an , you may file a petition for judicial review with the District Court. You must do so within 30 days of the Appeal Officer’s decision. You may be represented by an  at your own expense or you may contact the [facility] for possible representation.    [place]  for Injured Workers (NAIW): If you disagree with a  decision, you may request that NAIW represent you without charge at an  Hearing. For information regarding denial of benefits, you may contact the [facility] at: [address], [address], [place] [address], [phone], or [address], [place] [address], [phone]    To File a Complaint with the Division: If you wish to file a complaint with the  of the Division of Industrial Relations (DIR), please contact the Workers’ Compensation Section, [address], [address], [place], [place] [address], telephone (474) [phone], or [address], [place] [address], telephone [phone].    For assistance with Workers’ Compensation Issues: you may contact the Office of the Governor Consumer Health Assistance, [address], [address], [facility]  [place] [address], Toll Free 1-580.719.1310, Web site: http://govcha.state.[place], E-mail [email or web address].                                                                                                                                                                               __________________________________________________________________                                    _________________            Employee Name / Signature                                                                                                                            Date                                       D-2 (rev. 10/07)

## 2019-01-22 NOTE — LETTER
"  FORM C-4:  EMPLOYEE’S CLAIM FOR COMPENSATION/ REPORT OF INITIAL TREATMENT  EMPLOYEE’S CLAIM - PROVIDE ALL INFORMATION REQUESTED   First Name  Daron Last Name  Reshma Birthdate             Age  1970 48 y.o. 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 (if applicable)  Walking Driveway     How did this injury or occupational disease occur? Be specific and answer in detail. Use additional sheet if necessary)  Slipped on icy driveway    If you believe that you have an occupational disease, when did you first have knowledge of the disability and it relationship to your employment?  n/a Witnesses to the Accident  n/a     Nature of Injury or Occupational Disease  Workers' Compensation  Part(s) of Body Injured or Affected  Foot (R), N/A, N/A    I certify that the above is true and correct to the best of my knowledge and that I have provided this information in order to obtain the benefits of [place]’s [place] and [place] ([place] 616A to 616D, inclusive or " Chapter 617 of [place]RS).  I hereby authorize any physician, chiropractor, surgeon, practitioner, or other person, any hospital, including [facility] hospital, any medical service organization, any insurance company, or other institution or organization to release to each other, any medical or other information, including benefits paid or payable, pertinent to this injury or disease, except information relative to diagnosis, treatment and/or counseling for AIDS, psychological conditions, alcohol or controlled substances, for which I must give specific authorization.  A Photostat of this authorization shall be as valid as the original.   Date  1- [facility] Employee’s Signature   THIS REPORT MUST BE COMPLETED AND MAILED WITHIN 3 WORKING DAYS OF TREATMENT   Place  [facility], EMERGENCY DEPT  Name of Facility   [facility]   Date  1/22/2019 Diagnosis  (S82.891A) Closed fracture of right ankle, initial encounter Is there evidence the injured employee was under the influence of alcohol and/or another controlled substance at the time of accident?   Hour  11:08 PM Description of Injury or Disease  Closed fracture of right ankle, initial encounter No   Treatment  Fracture reduction under conscious sedation and splinting  Have you advised the patient to remain off work five days or more?         No   X-Ray Findings  Positive   If Yes   From Date    To Date      From information given by the employee, together with medical evidence, can you directly connect this injury or occupational disease as job incurred?  Yes If No, is the employee capable of: Full Duty  No Modified Duty  Yes   Is additional medical care by a physician indicated?  Yes If Modified Duty, Specify any Limitations / Restrictions  No weight to the right leg, no prolonged standing until cleared by orthopedics     Do you know of any previous injury or disease  "contributing to this condition or occupational disease?  No   Date  1/22/2019 Print Doctor’s Name  Bairon Sharif I certify the employer’s copy of this form was mailed on:   Address  [address] [facility] [address] [phone] Insurer’s Use Only   [facility]  [address]    Provider’s Tax ID Number  [id number] Telephone  Dept: [phone]    Doctor’s Signature  e-BAIRON Santos M.D. Degree  M.D.    Original - TREATING PHYSICIAN OR CHIROPRACTOR   Pg 2-Insurer/TPA   Pg 3-Employer   Pg 4-Employee                                                                                                  Form C-4 (rev01/03)     BRIEF DESCRIPTION OF RIGHTS AND BENEFITS  (Pursuant to NRS 616C.050)    Notice of Injury or Occupational Disease (Incident Report Form C-1): If an injury or occupational disease (OD) arises out of and in the course of employment, you must provide written notice to your employer as soon as practicable, but no later than 7 days after the accident or OD. Your employer shall maintain a sufficient supply of the required forms.  Claim for Compensation (Form C-4): If medical treatment is sought, the form C-4 is available at the place of initial treatment. A completed \"Claim for Compensation\" (Form C-4) must be filed within 90 days after an accident or OD. The treating physician or chiropractor must, within 3 working days after treatment, complete and mail to the employer, the employer's insurer and third-party , the Claim for Compensation.  Medical Treatment: If you require medical treatment for your on-the-job injury or OD, you may be required to select a physician or chiropractor from a list provided by your workers’ compensation insurer, if it has contracted with an Organization for Managed Care (MCO) or Preferred Provider Organization (PPO) or providers of health care. If your employer has not entered into a contract with an MCO or PPO, you may select a " physician or chiropractor from the Panel of Physicians and Chiropractors. Any medical costs related to your industrial injury or OD will be paid by your insurer.  Temporary Total Disability (TTD): If your doctor has certified that you are unable to work for a period of at least 5 consecutive days, or 5 cumulative days in a 20-day period, or places restrictions on you that your employer does not accommodate, you may be entitled to TTD compensation.  Temporary Partial Disability (TPD): If the wage you receive upon reemployment is less than the compensation for TTD to which you are entitled, the insurer may be required to pay you TPD compensation to make up the difference. TPD can only be paid for a maximum of 24 months.  Permanent Partial Disability (PPD): When your medical condition is stable and there is an indication of a PPD as a result of your injury or OD, within 30 days, your insurer must arrange for an evaluation by a rating physician or chiropractor to determine the degree of your PPD. The amount of your PPD award depends on the date of injury, the results of the PPD evaluation and your age and wage.  Permanent Total Disability (PTD): If you are medically certified by a treating physician or chiropractor as permanently and totally disabled and have been granted a PTD status by your insurer, you are entitled to receive monthly benefits not to exceed 66 2/3% of your average monthly wage. The amount of your PTD payments is subject to reduction if you previously received a PPD award.  Vocational Rehabilitation Services: You may be eligible for vocational rehabilitation services if you are unable to return to the job due to a permanent physical impairment or permanent restrictions as a result of your injury or occupational disease.  Transportation and Per Tika Reimbursement: You may be eligible for travel expenses and per tika associated with medical treatment.  Reopening: You may be able to reopen your claim if  your condition worsens after claim closure.  Appeal Process: If you disagree with a written determination issued by the insurer or the insurer does not respond to your request, you may appeal to the Department of Administration, , by following the instructions contained in your determination letter. You must appeal the determination within 70 days from the date of the determination letter at [address], [address], [place], [place] [address], or [address], [place], [place] [address]. If you disagree with the  decision, you may appeal to the Department of Administration, . You must file your appeal within 30 days from the date of the  decision letter at [address], [address], [place], [place] [address], or [address], [place], [place] [address]. If you disagree with a decision of an , you may file a petition for judicial review with the District Court. You must do so within 30 days of the Appeal Officer’s decision. You may be represented by an  at your own expense or you may contact the [facility] for possible representation.  [place]  for Injured Workers (NAIW): If you disagree with a  decision, you may request that NAIW represent you without charge at an  Hearing. For information regarding denial of benefits, you may contact the [facility] at: [address], [address], [place] [address], [phone], or [address], [place] [address], [phone]  To File a Complaint with the Division: If you wish to file a complaint with the  of the Division of Industrial Relations (DIR), please contact the Workers’ Compensation Section, [address], [address], [place], [place] [address], telephone ([phone]) [phone], or [address], [place], [place] [address], telephone [phone])  [phone].  For assistance with Workers’ Compensation Issues: you may contact the Office of the Governor Consumer Health Assistance, [address] [place] [address], [address], [place] [address], Toll Free 1-419.756.9752, Web site: http://govcha.[facility].[place]., E-mail [email or web address].                                                                                                                                                                               __________________________________________________________________                                    _________________            Employee Name / Signature                                                                                                                            Date                                       D-2 (rev. 10/07)

## 2019-01-22 NOTE — LETTER
[facility]  [address]  [place] [address]  Phone:  [phone] - Fax:  [phone]   [facility] Progress Report and Disability Certification  Date of Service: 2019   No Show:  No  Date / Time of Next Visit: 2019   Claim Information   Patient Name: Daron Justice  Claim Number:     Employer:   [employer] Date of Injury: 2019     Insurer / TPA: Kenton Rhodes  ID / SSN:     Occupation: Package   Diagnosis: The encounter diagnosis was Acute right ankle pain.    Medical Information   Related to Industrial Injury? 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      The injury mechanism was an inversion injury. The pain is present in the right ankle.   Now c/o constant, thobbing, moderate right ankle pain worse with attempting to bear weight.           The pain has been constant since onset. Pertinent negatives include no   loss of motion, muscle weakness, numbness or tingling. The symptoms are aggravated by weight bearing and palpation. pt has tried motrin for the symptoms. The treatment provided mild relief.        Objective Findings:    Left ankle: pt exhibits swelling and ecchymosis. Pt exhibits normal range of motion. Tenderness. Lateral malleolus tenderness found. Achilles tendon normal.        Left foot: There is normal range of motion, normal capillary refill and no crepitus.    Pre-Existing Condition(s):     Assessment:   Initial Visit    Status: Additional Care Required  Permanent Disability:No    Plan:      Diagnostics: X-ray  Comments:ankle fracture    Comments:       Disability Information   Status: Released to Restricted Duty    From:  2019  Through: 2019 Restrictions are:     Physical Restrictions   Sitting:    Standin hrs/day Stooping:    Bending:      Squatting:    Walking:  < or = to 1 hr/day Climbing:    Pushing:      Pulling:    Other:    Reaching Above  Shoulder (L):   Reaching Above Shoulder (R):       Reaching Below Shoulder (L):    Reaching Below Shoulder (R):      Not to exceed Weight Limits   Carrying(hrs):   Weight Limit(lb):   Lifting(hrs):   Weight  Limit(lb):     Comments:  Acute right ankle fracture    X-rays were personally reviewed by myself.         Acute fracture of the posterior malleolus with dislocated tibiotalar joint.  Acute comminuted and angulated fracture of the distal fibula.      Fracture is ABOVE the mortise, and is unstable.          Patient requires a higher level of care and urgent orthopaedic consultation    Will transfer to ER        Called report to Dr. Kennedy at [facility]    Repetitive Actions   Hands: i.e. Fine Manipulations from Grasping:     Feet: i.e. Operating Foot Controls:   Comments:left   Driving / Operate Machinery:     Physician Name: Quoc Menendez M.D. Physician Signature: QUOC Meza M.D. e-Signature: Dr. Juan Mehta, Medical Director   Clinic Name / Location: [address] [address] Clinic Phone Number: Dept: [phone]   Appointment Time: 4:35 Pm Visit Start Time: 5:00 PM   Check-In Time:  4:49 Pm Visit Discharge Time:  5:03PM   Original-Treating Physician or Chiropractor    Page 2-Insurer/TPA    Page 3-Employer    Page 4-Employee

## 2019-01-22 NOTE — LETTER
[facility]  [address]  [place] [address]  Phone:  [phone] - Fax:  [phone]   [facility] Progress Report and Disability Certification  Date of Service: 2019   No Show:  No  Date / Time of Next Visit: 2019   Claim Information   Patient Name: Daron Justice  Claim Number:     Employer:   *** Date of Injury: 2019     Insurer / TPA: Laurie[employer]john [employer]kins *** ID / SSN:     Occupation: Package  *** Diagnosis: The encounter diagnosis was Acute right ankle pain.    Medical Information   Related to Industrial Injury? 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      The injury mechanism was an inversion injury. The pain is present in the right ankle.   Now c/o constant, thobbing, moderate right ankle pain worse with attempting to bear weight.           The pain has been constant since onset. Pertinent negatives include no   loss of motion, muscle weakness, numbness or tingling. The symptoms are aggravated by weight bearing and palpation. pt has tried motrin for the symptoms. The treatment provided mild relief.        Objective Findings:    Left ankle: pt exhibits swelling and ecchymosis. Pt exhibits normal range of motion. Tenderness. Lateral malleolus tenderness found. Achilles tendon normal.        Left foot: There is normal range of motion, normal capillary refill and no crepitus.    Pre-Existing Condition(s):     Assessment:   Initial Visit    Status: Additional Care Required  Permanent Disability:No    Plan:      Diagnostics: X-ray  Comments:ankle fracture    Comments:       Disability Information   Status: Released to Restricted Duty    From:  2019  Through: 2019 Restrictions are:     Physical Restrictions   Sitting:    Standin hrs/day Stooping:    Bending:      Squatting:    Walking:  < or = to 1 hr/day Climbing:    Pushing:      Pulling:    Other:   Reaching Above Shoulder (L):   Reaching Above Shoulder (R):       Reaching Below Shoulder (L):    Reaching Below Shoulder (R):      Not to exceed Weight Limits   Carrying(hrs):   Weight Limit(lb):   Lifting(hrs):   Weight  Limit(lb):     Comments:  Acute right ankle fracture    X-rays were personally reviewed by myself.         Acute fracture of the posterior malleolus with dislocated tibiotalar joint.  Acute comminuted and angulated fracture of the distal fibula.      Fracture is ABOVE the mortise, and is unstable.          Patient requires a higher level of care and urgent orthopaedic consultation    Will transfer to ER        Called report to Dr. Kennedy at [facility]    Repetitive Actions   Hands: i.e. Fine Manipulations from Grasping:     Feet: i.e. Operating Foot Controls:   Comments:left   Driving / Operate Machinery:     Physician Name: Quoc Menendez M.D. Physician Signature: QUOC Meza M.D. e-Signature: Dr. Juan Mehta, Medical Director   Clinic Name / Location: [address] [address] Clinic Phone Number: Dept: [phone]   Appointment Time: 4:35 Pm Visit Start Time: 5:00 PM   Check-In Time:  4:49 Pm Visit Discharge Time:  ***   Original-Treating Physician or Chiropractor    Page 2-Insurer/TPA    Page 3-Employer    Page 4-Employee

## 2019-01-23 NOTE — ED NOTES
Patient's ankle reduced by Dr. Sharif and ankle splinted. Patient remains A&O x4, talking and alert but states he is comfortably. Patient in no respiratory distress, VSS. Patient on all monitors with airway supplies at bedside.

## 2019-01-23 NOTE — ED NOTES
Patient given DC paperwork and prescriptions. Patient instructed to follow up with orthopedist in 2 days. Patient told to return to the ER for new or worsening symptoms. Patient A&O x4 and verbalizes understanding of instructions. Patient ambulatory with steady gait.

## 2019-01-23 NOTE — ED NOTES
X-ray at bedside for post reduction imaging of right ankle. Patient A&O x4, talking and laughing with wife. Patient in no apparent distress, VSS.

## 2019-01-23 NOTE — ED NOTES
Patient resting comfortably in bed with wife at bedside. Patient has no complaints at this time. Patient A&O x4 and acting appropriately. Respirations even and regular. VSS.

## 2019-01-23 NOTE — ED PROVIDER NOTES
"ED Provider Note    CHIEF COMPLAINT  Chief Complaint   Patient presents with   • Ankle Pain     right   • Sent by MD     urgent care       HPI  Daron Justice is a 48 y.o. male who presents with right ankle pain.  The patient was walking when he slipped and suffered from a hyperextension injury to his right ankle.  The patient cannot ambulate due to the pain and a deformity to the distal right leg.  The patient went to urgent care and was found to have a fracture dislocation of the ankle and is transferred here for higher level of care.  The patient states he has localized discomfort to the ankle.  He does not have any tingling to the right foot.  He can move his right toes but does have significant discomfort with any attempted movement at the right ankle where he has an obvious deformity.  He does not have any proximal right leg tenderness.    REVIEW OF SYSTEMS  See HPI for further details. All other systems are negative.     PAST MEDICAL HISTORY  No past medical history on file.    FAMILY HISTORY  [unfilled]    SOCIAL HISTORY  Social History     Social History   • Marital status:      Spouse name: N/A   • Number of children: N/A   • Years of education: N/A     Social History Main Topics   • Smoking status: Never Smoker   • Smokeless tobacco: Never Used   • Alcohol use Yes   • Drug use: No   • Sexual activity: Not on file     Other Topics Concern   • Not on file     Social History Narrative   • No narrative on file       SURGICAL HISTORY  No past surgical history on file.    CURRENT MEDICATIONS  Home Medications    **Home medications have not yet been reviewed for this encounter**         ALLERGIES  No Known Allergies    PHYSICAL EXAM  VITAL SIGNS: /78   Pulse 87   Temp 37 °C (98.6 °F) (Temporal)   Resp 14   Ht 1.956 m (6' 5\")   Wt 115 kg (253 lb 8.5 oz)   SpO2 96%   BMI 30.06 kg/m²  Room air O2: 96    Constitutional: in acute distress, Non-toxic appearance.   HENT: Normocephalic, Atraumatic, " Bilateral external ears normal, Oropharynx moist, No oral exudates, Nose normal.   Eyes: PERRLA, EOMI, Conjunctiva normal, No discharge.   Neck: Normal range of motion, No tenderness, Supple, No stridor.   Lymphatic: No lymphadenopathy noted.   Cardiovascular: Normal heart rate, Normal rhythm, No murmurs, No rubs, No gallops.   Thorax & Lungs: Normal breath sounds, No respiratory distress, No wheezing, No chest tenderness.   Abdomen: Bowel sounds normal, Soft, No tenderness, No masses, No pulsatile masses.   Skin: Warm, Dry, No erythema, No rash.   Back: No tenderness, No CVA tenderness.   Extremities: The patient has an obvious deformity to the right lower extremity.  His right foot does appear slightly cyanotic however he does have a good dorsalis pedis pulse.  I cannot palpate a posterior tibial pulse most likely due to the deformity and swelling.  The patient does have capillary refill.  The patient does not have any proximal right leg tenderness.  He does have significant soft tissue swelling throughout the right ankle.  He also has a normal right midfoot exam.  Extremities otherwise atraumatic  Neurologic: Alert & oriented x 3, Normal motor function, Normal sensory function, No focal deficits noted.   Psychiatric: Affect normal, Judgment normal, Mood normal.     RADIOLOGY/  X-ray shows good interval improvement of the fracture dislocation of the right ankle.  The patient does have a bimalleolar fracture with a widened mortise    PROCEDURES conscious sedation  Conscious Sedation Procedure Note    Indication: fracture dislocation    Consent: I have discussed with the patient and/or the patient representative the indication, alternatives, and the possible risks and/or complications of the planned procedure and the anesthesia methods. The patient and/or patient representative appear to understand and agree to proceed.    Physician Involvement: The attending physician was present and supervising this  procedure.    Pre-Sedation Documentation and Exam: I have personally completed a history, physical exam & review of systems for this patient (see notes).  Airway Assessment: normal    Prior History of Anesthesia Complications: none    ASA Classification: Class 1 - A normal healthy patient    Sedation/ Anesthesia Plan: intravenous sedation    Medications Used: propofol intravenously    Monitoring and Safety: The patient was placed on a cardiac monitor and vital signs, pulse oximetry and level of consciousness were continuously evaluated throughout the procedure. The patient was closely monitored until recovery from the medications was complete and the patient had returned to baseline status. Respiratory therapy was on standby at all times during the procedure.    (The following sections must be completed)  Post-Sedation Vital Signs and exam: The patient is back to his baseline after the sedation, his lungs are clear, his mentation is normal, please see the nursing notes for the vital signs            Complications: none    Total sedation time including recovery about 30 minutes    Procedure right ankle fracture dislocation reduction  Under conscious sedation is able to reduce the fracture with longitudinal traction and pressure on the anterior aspect of the distal fibula and tibia.  After the reduction the patient had good pulses.  The patient had a lot of padding applied as well as a posterior splint.        COURSE & MEDICAL DECISION MAKING  Pertinent Labs & Imaging studies reviewed. (See chart for details)  This a 48-year-old male who presents the [facility] with a right ankle fracture dislocation.  He does have a bimalleolar fracture and he is aware this may require surgical intervention as I suspect it is can be an unstable fracture.  I did reduce the dislocation and the postreduction films do show good alignment.  The patient also had an x-ray of the proximal tibiofibular make sure there is no proximal  fracture and this was negative.  I spoke with orthopedics and will see the patient in the clinic this week to arrange for surgical intervention.  We did discuss the risk of narcotics and the patient receive a short 2-day course of Percocet for pain control.  He is aware of the alternatives with Motrin and Tylenol.  He only take the pain medication as tolerated.  A narcotic check will be performed prior to prescribing the medication.    FINAL IMPRESSION  1.  Right ankle fracture dislocation  2.  Conscious sedation  3.  Right ankle fracture dislocation reduction         Electronically signed by: Bairon Sharif, 1/22/2019 8:33 PM

## 2019-01-23 NOTE — ED TRIAGE NOTES
Chief Complaint   Patient presents with   • Ankle Pain     right   • Sent by MD     urgent care     Pt states he was walking when he tripped and hyperextended his ankle.  Pt states he had x-ray at urgent care and he has a fracture in his ankle.  Pt sent by urgent care for ortho.  + cms in foot.  Pt using cruthches from home.  Triage process explained to patient.  Pt back to waiting room.  Pt instructed to inform RN if any changes or questions arise.

## 2019-01-23 NOTE — PROGRESS NOTES
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      The injury mechanism was an inversion injury. The pain is present in the right ankle.   Now c/o constant, thobbing, moderate right ankle pain worse with attempting to bear weight.           The pain has been constant since onset. Pertinent negatives include no   loss of motion, muscle weakness, numbness or tingling. The symptoms are aggravated by weight bearing and palpation. pt has tried motrin for the symptoms. The treatment provided mild relief.       Social History   Substance Use Topics   • Smoking status: Never Smoker   • Smokeless tobacco: Never Used   • Alcohol use Yes          Past medical history was unremarkable and not pertinent to current issue  Family history was reviewed and not pertinent             Review of Systems   Constitutional: Negative for fever, chills and malaise/fatigue.   Eyes: Negative for vision changes, d/c.    Respiratory: Negative for cough and sputum production.    Cardiovascular: Negative for chest pain and palpitations.   Gastrointestinal: Negative for nausea, vomiting, abdominal pain, diarrhea and constipation.   Genitourinary: Negative for dysuria, urgency and frequency.   Skin: Negative for rash or  itching.   Neurological: Negative for dizziness and tingling.   Psychiatric/Behavioral: Negative for depression.   Hematologic/lymphatic - denies bruising or excessive bleeding  All other systems reviewed and are negative.              Objective:     Blood pressure 154/92, pulse 84, temperature 37.4 °C (99.3 °F), temperature source Temporal, resp. rate 18, height 1.956 m (6' 5\"), weight 106.6 kg (235 lb), SpO2 97 %.    Physical Exam   Constitutional: pt is oriented to person, place, and time. Pt appears well-developed. No distress.   HENT:   Head: Normocephalic and atraumatic. "   Eyes: Conjunctivae are normal.   Cardiovascular: Normal rate and regular rhythm.    Pulmonary/Chest: Effort normal and breath sounds normal.   Musculoskeletal:        Left ankle: pt exhibits swelling and ecchymosis. Pt exhibits limited range of motion. Tenderness. Lateral malleolus tenderness found.  There is tenderness to palpation over lateral and medial malleoli.   Achilles tendon normal.   DP pulse 2+         Left foot: There is normal range of motion, normal capillary refill and no crepitus.   Neurological: pt is alert and oriented to person, place, and time. No cranial nerve deficit.   Skin: Skin is warm. Pt is not diaphoretic. No erythema.   Psychiatric: His behavior is normal.   Nursing note and vitals reviewed.       Narrative       1/22/2019 5:56 PM    HISTORY/REASON FOR EXAM:  pain; Pain/Deformity Following Trauma  UC fell today hyperextended foot    TECHNIQUE/EXAM DESCRIPTION AND NUMBER OF VIEWS:  3 views of the RIGHT ankle.    COMPARISON: None.    FINDINGS:  Acute fracture of the posterior malleolus with dislocated tibiotalar joint.  Acute comminuted and angulated fracture of the distal fibula.  Moderate soft tissue swelling.   Impression         Acute posterior and lateral malleolar fractures with dislocated tibiotalar joint   Reading Provider Reading Date   Jonathan Wright M.D. Jan 22, 2019   Signing Provider Signing Date Signing Time   Jonathan Wright M.D. Jan 22, 2019  6:09 PM            Assessment/Plan:     1. Acute right ankle fracture    X-rays were personally reviewed by myself.         Acute fracture of the posterior malleolus with dislocated tibiotalar joint.  Acute comminuted and angulated fracture of the distal fibula.      Fracture is ABOVE the mortise, and is unstable.          Patient requires a higher level of care and urgent orthopaedic consultation    Will transfer to ER        Called report to Dr. Kennedy at [facility]

## 2019-08-06 ENCOUNTER — OFFICE VISIT (OUTPATIENT)
Dept: URGENT CARE | Facility: PHYSICIAN GROUP | Age: 49
End: 2019-08-06

## 2019-08-06 VITALS
WEIGHT: 258 LBS | OXYGEN SATURATION: 97 % | RESPIRATION RATE: 18 BRPM | BODY MASS INDEX: 30.46 KG/M2 | TEMPERATURE: 97.4 F | HEART RATE: 63 BPM | HEIGHT: 77 IN

## 2019-08-06 DIAGNOSIS — Z02.89 ENCOUNTER FOR EXAMINATION REQUIRED BY DEPARTMENT OF TRANSPORTATION (DOT): ICD-10-CM

## 2019-08-06 PROCEDURE — 7100 PR DOT PHYSICAL: Performed by: PHYSICIAN ASSISTANT

## 2021-07-20 ENCOUNTER — HOSPITAL ENCOUNTER (EMERGENCY)
Facility: MEDICAL CENTER | Age: 51
End: 2021-07-20
Attending: EMERGENCY MEDICINE
Payer: COMMERCIAL

## 2021-07-20 VITALS
SYSTOLIC BLOOD PRESSURE: 118 MMHG | BODY MASS INDEX: 29.65 KG/M2 | DIASTOLIC BLOOD PRESSURE: 66 MMHG | HEART RATE: 72 BPM | OXYGEN SATURATION: 93 % | WEIGHT: 250 LBS | TEMPERATURE: 97.2 F | RESPIRATION RATE: 20 BRPM

## 2021-07-20 DIAGNOSIS — F10.920 ACUTE ALCOHOLIC INTOXICATION WITHOUT COMPLICATION (HCC): ICD-10-CM

## 2021-07-20 DIAGNOSIS — R42 DIZZINESS: ICD-10-CM

## 2021-07-20 LAB
ALBUMIN SERPL BCP-MCNC: 4.2 G/DL (ref 3.2–4.9)
ALBUMIN/GLOB SERPL: 1.2 G/DL
ALP SERPL-CCNC: 78 U/L (ref 30–99)
ALT SERPL-CCNC: 24 U/L (ref 2–50)
ANION GAP SERPL CALC-SCNC: 16 MMOL/L (ref 7–16)
AST SERPL-CCNC: 18 U/L (ref 12–45)
BASOPHILS # BLD AUTO: 0.5 % (ref 0–1.8)
BASOPHILS # BLD: 0.03 K/UL (ref 0–0.12)
BILIRUB SERPL-MCNC: 0.2 MG/DL (ref 0.1–1.5)
BUN SERPL-MCNC: 12 MG/DL (ref 8–22)
CALCIUM SERPL-MCNC: 8.8 MG/DL (ref 8.4–10.2)
CHLORIDE SERPL-SCNC: 100 MMOL/L (ref 96–112)
CO2 SERPL-SCNC: 20 MMOL/L (ref 20–33)
CREAT SERPL-MCNC: 0.78 MG/DL (ref 0.5–1.4)
EOSINOPHIL # BLD AUTO: 0.04 K/UL (ref 0–0.51)
EOSINOPHIL NFR BLD: 0.6 % (ref 0–6.9)
ERYTHROCYTE [DISTWIDTH] IN BLOOD BY AUTOMATED COUNT: 41.2 FL (ref 35.9–50)
ETHANOL BLD-MCNC: 360 MG/DL (ref 0–10)
GLOBULIN SER CALC-MCNC: 3.4 G/DL (ref 1.9–3.5)
GLUCOSE SERPL-MCNC: 116 MG/DL (ref 65–99)
HCT VFR BLD AUTO: 43.3 % (ref 42–52)
HGB BLD-MCNC: 14.9 G/DL (ref 14–18)
IMM GRANULOCYTES # BLD AUTO: 0.02 K/UL (ref 0–0.11)
IMM GRANULOCYTES NFR BLD AUTO: 0.3 % (ref 0–0.9)
LYMPHOCYTES # BLD AUTO: 0.88 K/UL (ref 1–4.8)
LYMPHOCYTES NFR BLD: 13.9 % (ref 22–41)
MCH RBC QN AUTO: 31.3 PG (ref 27–33)
MCHC RBC AUTO-ENTMCNC: 34.4 G/DL (ref 33.7–35.3)
MCV RBC AUTO: 91 FL (ref 81.4–97.8)
MONOCYTES # BLD AUTO: 0.34 K/UL (ref 0–0.85)
MONOCYTES NFR BLD AUTO: 5.4 % (ref 0–13.4)
NEUTROPHILS # BLD AUTO: 5.04 K/UL (ref 1.82–7.42)
NEUTROPHILS NFR BLD: 79.3 % (ref 44–72)
NRBC # BLD AUTO: 0 K/UL
NRBC BLD-RTO: 0 /100 WBC
PLATELET # BLD AUTO: 251 K/UL (ref 164–446)
PMV BLD AUTO: 8.7 FL (ref 9–12.9)
POTASSIUM SERPL-SCNC: 4.6 MMOL/L (ref 3.6–5.5)
PROT SERPL-MCNC: 7.6 G/DL (ref 6–8.2)
RBC # BLD AUTO: 4.76 M/UL (ref 4.7–6.1)
SODIUM SERPL-SCNC: 136 MMOL/L (ref 135–145)
WBC # BLD AUTO: 6.4 K/UL (ref 4.8–10.8)

## 2021-07-20 PROCEDURE — 85025 COMPLETE CBC W/AUTO DIFF WBC: CPT

## 2021-07-20 PROCEDURE — 99284 EMERGENCY DEPT VISIT MOD MDM: CPT

## 2021-07-20 PROCEDURE — 80053 COMPREHEN METABOLIC PANEL: CPT

## 2021-07-20 PROCEDURE — 82077 ASSAY SPEC XCP UR&BREATH IA: CPT

## 2021-07-20 RX ORDER — ACETAMINOPHEN 500 MG
1000 TABLET ORAL EVERY 6 HOURS PRN
Status: SHIPPED | COMMUNITY
End: 2023-07-24

## 2021-07-20 NOTE — ED NOTES
Med rec updated and complete  Allergies reviewed  Pt reports no prescription medications or vitamins.  Pt reports no antibiotics in the last 30 days.      No current facility-administered medications on file prior to encounter.     Current Outpatient Medications on File Prior to Encounter   Medication Sig Dispense Refill   • acetaminophen (TYLENOL) 500 MG Tab Take 1,000 mg by mouth every 6 hours as needed for Moderate Pain.

## 2021-07-20 NOTE — ED NOTES
"Pt resting quietly, states is feeling \"good,\" denies feeling dizzy or lightheaded at this time.      "

## 2021-07-20 NOTE — ED TRIAGE NOTES
Chief Complaint   Patient presents with   • Dizziness     pt BIB [facility] from dentist office c/o dizziness after his root canal. pt states his dizziness resolved while in triage. pt was given lidocaine for numbing   100 cc ns at dentist office.     /72   Pulse 65   Temp 36.2 °C (97.2 °F) (Temporal)   Resp 20   Wt 113 kg (250 lb)   SpO2 94%   BMI 29.65 kg/m²

## 2021-07-20 NOTE — ED PROVIDER NOTES
"ED Provider Note    CHIEF COMPLAINT  Chief Complaint   Patient presents with   • Dizziness     pt SARAH gustafson from dentist office c/o dizziness after his root canal. pt states his dizziness resolved while in triage. pt was given lidocaine for numbing       HPI  Daron Justice is a 50 y.o. male who presents from a dentist office, did receive lidocaine to numb the tooth however was complaining of dizziness and sent here for further evaluation.  Patient denies medication other than Tylenol or Advil.  No drug or alcohol use today.  No syncope.  Patient states this morning as well, he is never had this reaction from a dental procedure before.  States he felt fine, dental pain is much better after the procedure however when he got up felt like his balance was off, described in general he is dizzy.  He denied spinning sensation.  No nausea or vomiting.  No chest pain, no palpitation.  Patient received lidocaine and Cetacaine at the dentist office only.  Patient states \"I am feeling much better now\"    REVIEW OF SYSTEMS    Constitutional: Dizzy, no fever  Respiratory: No shortness of breath  Cardiac: Chest pain or syncope, no palpitation  Gastrointestinal: No nausea or vomiting  Musculoskeletal: No neck pain  Neurologic: Dizziness now resolved.  No numbness, no weakness, no headache  Eyes: No vision change  ENT: Dental pain, improved            PAST MEDICAL HISTORY  History reviewed. No pertinent past medical history.    FAMILY HISTORY  History reviewed. No pertinent family history.    SOCIAL HISTORY  Social History     Socioeconomic History   • Marital status:      Spouse name: Not on file   • Number of children: Not on file   • Years of education: Not on file   • Highest education level: Not on file   Occupational History   • Not on file   Tobacco Use   • Smoking status: Never Smoker   • Smokeless tobacco: Never Used   Substance and Sexual Activity   • Alcohol use: Yes   • Drug use: No   • Sexual activity: Not on file "   Other Topics Concern   • Not on file   Social History Narrative   • Not on file     Social Determinants of Health     Financial Resource Strain:    • Difficulty of Paying Living Expenses:    Food Insecurity:    • Worried About Running Out of Food in the Last Year:    • Ran Out of Food in the Last Year:    Transportation Needs:    • Lack of Transportation (Medical):    • Lack of Transportation (Non-Medical):    Physical Activity:    • Days of Exercise per Week:    • Minutes of Exercise per Session:    Stress:    • Feeling of Stress :    Social Connections:    • Frequency of Communication with Friends and Family:    • Frequency of Social Gatherings with Friends and Family:    • Attends [employer] Services:    • Active Member of Clubs or Organizations:    • Attends Club or Organization Meetings:    • Marital Status:    Intimate Partner Violence:    • Fear of Current or Ex-Partner:    • Emotionally Abused:    • Physically Abused:    • Sexually Abused:        SURGICAL HISTORY  History reviewed. No pertinent surgical history.    CURRENT MEDICATIONS  Home Medications     Reviewed by Yogi Vasquez (Pharmacy Tech) on 07/20/21 at 1406  Med List Status: Complete   Medication Last Dose Status   acetaminophen (TYLENOL) 500 MG Tab 7/20/2021 Active                ALLERGIES  No Known Allergies    PHYSICAL EXAM  VITAL SIGNS: /66   Pulse 72   Temp 36.2 °C (97.2 °F) (Temporal)   Resp 20   Wt 113 kg (250 lb)   SpO2 93%   BMI 29.65 kg/m²   Constitutional:  Non-toxic appearance.   HENT: No facial swelling, no facial droop  Eyes: Anicteric, no conjunctivitis.  No nystagmus.  Pupils are equal  Cardiovascular: Normal heart rate, Normal rhythm  Pulmonary:  No wheezing, No rales.   Gastrointestinal: Soft, No tenderness, No tension  Skin: Warm, Dry, No cyanosis.   Neurologic: Speech is clear, follows commands, facial expression is symmetrical.  Strength and sensation intact.  Patient is alert to date, circumstances of  today's visit, location  Psychiatric: Affect normal,  Mood normal.  Patient is calm and cooperative  Musculoskeletal: No chest wall retraction    EKG/Labs  Results for orders placed or performed during the hospital encounter of 07/20/21   CBC WITH DIFFERENTIAL   Result Value Ref Range    WBC 6.4 4.8 - 10.8 K/uL    RBC 4.76 4.70 - 6.10 M/uL    Hemoglobin 14.9 14.0 - 18.0 g/dL    Hematocrit 43.3 42.0 - 52.0 %    MCV 91.0 81.4 - 97.8 fL    MCH 31.3 27.0 - 33.0 pg    MCHC 34.4 33.7 - 35.3 g/dL    RDW 41.2 35.9 - 50.0 fL    Platelet Count 251 164 - 446 K/uL    MPV 8.7 (L) 9.0 - 12.9 fL    Neutrophils-Polys 79.30 (H) 44.00 - 72.00 %    Lymphocytes 13.90 (L) 22.00 - 41.00 %    Monocytes 5.40 0.00 - 13.40 %    Eosinophils 0.60 0.00 - 6.90 %    Basophils 0.50 0.00 - 1.80 %    Immature Granulocytes 0.30 0.00 - 0.90 %    Nucleated RBC 0.00 /100 WBC    Neutrophils (Absolute) 5.04 1.82 - 7.42 K/uL    Lymphs (Absolute) 0.88 (L) 1.00 - 4.80 K/uL    Monos (Absolute) 0.34 0.00 - 0.85 K/uL    Eos (Absolute) 0.04 0.00 - 0.51 K/uL    Baso (Absolute) 0.03 0.00 - 0.12 K/uL    Immature Granulocytes (abs) 0.02 0.00 - 0.11 K/uL    NRBC (Absolute) 0.00 K/uL   COMP METABOLIC PANEL   Result Value Ref Range    Sodium 136 135 - 145 mmol/L    Potassium 4.6 3.6 - 5.5 mmol/L    Chloride 100 96 - 112 mmol/L    Co2 20 20 - 33 mmol/L    Anion Gap 16.0 7.0 - 16.0    Glucose 116 (H) 65 - 99 mg/dL    Bun 12 8 - 22 mg/dL    Creatinine 0.78 0.50 - 1.40 mg/dL    Calcium 8.8 8.4 - 10.2 mg/dL    AST(SGOT) 18 12 - 45 U/L    ALT(SGPT) 24 2 - 50 U/L    Alkaline Phosphatase 78 30 - 99 U/L    Total Bilirubin 0.2 0.1 - 1.5 mg/dL    Albumin 4.2 3.2 - 4.9 g/dL    Total Protein 7.6 6.0 - 8.2 g/dL    Globulin 3.4 1.9 - 3.5 g/dL    A-G Ratio 1.2 g/dL   ETHYL ALCOHOL (BLOOD)   Result Value Ref Range    Diagnostic Alcohol 360.0 (H) 0.0 - 10.0 mg/dL   ESTIMATED GFR   Result Value Ref Range    GFR If African American >60 >60 mL/min/1.73 m 2    GFR If Non African American  ">60 >60 mL/min/1.73 m 2           COURSE & MEDICAL DECISION MAKING  Pertinent Labs & Imaging studies reviewed. (See chart for details)  Patient had denied any drug or alcohol use this morning however blood alcohol level returns at 360. I discussed with the patient this number, he states now \"I think I remember having something this morning\". He still states he feels better at this time, plan for discharge in the care of family or friend. He is advised he is too intoxicated to drive himself. Urine drug screen has been canceled as there is adequate evidence in his labs as to why the patient is feeling dizzy and not acting his usual self at this time.    FINAL IMPRESSION     1. Dizziness     2. Acute alcoholic intoxication without complication (HCC)                     Electronically signed by: Sami Ang M.D., 7/20/2021 2:09 PM    "

## 2021-08-02 ENCOUNTER — OFFICE VISIT (OUTPATIENT)
Dept: URGENT CARE | Facility: PHYSICIAN GROUP | Age: 51
End: 2021-08-02
Payer: COMMERCIAL

## 2021-08-02 VITALS
HEART RATE: 64 BPM | HEIGHT: 75 IN | TEMPERATURE: 97.6 F | WEIGHT: 245 LBS | OXYGEN SATURATION: 98 % | RESPIRATION RATE: 16 BRPM | BODY MASS INDEX: 30.46 KG/M2

## 2021-08-02 ASSESSMENT — FIBROSIS 4 INDEX: FIB4 SCORE: 0.76

## 2021-08-04 ENCOUNTER — OFFICE VISIT (OUTPATIENT)
Dept: URGENT CARE | Facility: PHYSICIAN GROUP | Age: 51
End: 2021-08-04
Payer: COMMERCIAL

## 2021-08-04 VITALS
OXYGEN SATURATION: 98 % | RESPIRATION RATE: 16 BRPM | BODY MASS INDEX: 29.42 KG/M2 | HEIGHT: 76 IN | HEART RATE: 65 BPM | WEIGHT: 241.6 LBS | TEMPERATURE: 97.9 F | SYSTOLIC BLOOD PRESSURE: 158 MMHG | DIASTOLIC BLOOD PRESSURE: 82 MMHG

## 2021-08-04 DIAGNOSIS — Z02.4 ENCOUNTER FOR COMMERCIAL DRIVER MEDICAL EXAMINATION (CDME): ICD-10-CM

## 2021-08-04 PROCEDURE — 7100 PR DOT PHYSICAL: Performed by: NURSE PRACTITIONER

## 2021-08-04 ASSESSMENT — FIBROSIS 4 INDEX: FIB4 SCORE: 0.76

## 2021-08-09 NOTE — PROGRESS NOTES
CC) Here today for [employer] employment physical exam.   S) Reviewed History with pt.        No complaints    O) See physical exam forms and diagnostics attached to visit today.       A)   1. Encounter for  medical examination ([employer])           P) as documented on physical exam forms.

## 2022-07-29 ENCOUNTER — OFFICE VISIT (OUTPATIENT)
Dept: URGENT CARE | Facility: PHYSICIAN GROUP | Age: 52
End: 2022-07-29

## 2022-07-29 VITALS
HEIGHT: 77 IN | HEART RATE: 79 BPM | DIASTOLIC BLOOD PRESSURE: 78 MMHG | TEMPERATURE: 97 F | BODY MASS INDEX: 30.11 KG/M2 | OXYGEN SATURATION: 100 % | RESPIRATION RATE: 18 BRPM | WEIGHT: 255 LBS | SYSTOLIC BLOOD PRESSURE: 126 MMHG

## 2022-07-29 DIAGNOSIS — Z02.4 ENCOUNTER FOR COMMERCIAL DRIVING LICENSE (CDL) EXAM: ICD-10-CM

## 2022-07-29 PROCEDURE — 7100 PR DOT PHYSICAL: Performed by: FAMILY MEDICINE

## 2022-07-29 RX ORDER — LOSARTAN POTASSIUM 50 MG/1
50 TABLET ORAL DAILY
COMMUNITY

## 2022-07-29 RX ORDER — ASPIRIN 81 MG/1
81 TABLET, CHEWABLE ORAL DAILY
COMMUNITY

## 2022-07-29 RX ORDER — ATORVASTATIN CALCIUM 20 MG/1
20 TABLET, FILM COATED ORAL NIGHTLY
COMMUNITY

## 2022-07-29 ASSESSMENT — FIBROSIS 4 INDEX: FIB4 SCORE: 0.78

## 2022-07-29 NOTE — PROGRESS NOTES
"Subjective     Daron Justice is a 51 y.o. male who presents with Employment Physical ([employer])    See scanned history, physical  and clearance status in EPIC    Patient denies any history of seizures, dialysis, insulin use, pacemaker/defibrillator, syncope, arrhythmias/murmurs, vertigo/meniere's disease, illicit drug use, regular sedating medication use, ETOH abuse, or any weakness/paresthesias to extremities.     Cleared:  X 1 yr (HTN)     Renew: yes    Corrective lenses:  none            HPI    ROS           Objective     /78   Pulse 79   Temp 36.1 °C (97 °F) (Temporal)   Resp 18   Ht 1.956 m (6' 5\")   Wt 116 kg (255 lb)   SpO2 100%   BMI 30.24 kg/m²      Physical Exam                        Assessment & Plan           "

## 2023-07-24 ENCOUNTER — OFFICE VISIT (OUTPATIENT)
Dept: URGENT CARE | Facility: PHYSICIAN GROUP | Age: 53
End: 2023-07-24

## 2023-07-24 VITALS
DIASTOLIC BLOOD PRESSURE: 72 MMHG | SYSTOLIC BLOOD PRESSURE: 138 MMHG | WEIGHT: 286 LBS | HEIGHT: 77 IN | RESPIRATION RATE: 14 BRPM | BODY MASS INDEX: 33.77 KG/M2

## 2023-07-24 DIAGNOSIS — Z02.4 ENCOUNTER FOR DEPARTMENT OF TRANSPORTATION (DOT) EXAMINATION FOR TRUCKING LICENSE: ICD-10-CM

## 2023-07-24 DIAGNOSIS — I10 PRIMARY HYPERTENSION: ICD-10-CM

## 2023-07-24 DIAGNOSIS — G47.33 OBSTRUCTIVE SLEEP APNEA SYNDROME: ICD-10-CM

## 2023-07-24 PROBLEM — J36 PERITONSILLAR ABSCESS: Status: RESOLVED | Noted: 2018-06-09 | Resolved: 2023-07-24

## 2023-07-24 PROCEDURE — 3078F DIAST BP <80 MM HG: CPT | Performed by: FAMILY MEDICINE

## 2023-07-24 PROCEDURE — 3075F SYST BP GE 130 - 139MM HG: CPT | Performed by: FAMILY MEDICINE

## 2023-07-24 PROCEDURE — 7100 PR DOT PHYSICAL: Performed by: FAMILY MEDICINE

## 2023-07-24 NOTE — PROGRESS NOTES
Patient reports negative health history.  Patient does have history of hypertension for which he takes losartan.  Blood pressure is stable with this, he follows with PCP regularly.  He denies any chest pain, palpitations, or shortness of breath.    He also has a history of obstructive sleep apnea for which he uses a CPAP machine.  He has been doing this for greater than 6 months.  He has no issues with the machine and denies any excessive daytime sleepiness.  He has never been seen by pulmonologist for this, this is managed by his PCP.    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  Please see scanned documents for further detail.

## 2023-11-27 ENCOUNTER — HOSPITAL ENCOUNTER (OUTPATIENT)
Dept: LAB | Facility: MEDICAL CENTER | Age: 53
End: 2023-11-27
Attending: NURSE PRACTITIONER
Payer: COMMERCIAL

## 2023-11-27 LAB
25(OH)D3 SERPL-MCNC: 55 NG/ML (ref 30–100)
ALBUMIN SERPL BCP-MCNC: 4.8 G/DL (ref 3.2–4.9)
ALBUMIN/GLOB SERPL: 1.5 G/DL
ALP SERPL-CCNC: 85 U/L (ref 30–99)
ALT SERPL-CCNC: 66 U/L (ref 2–50)
ANION GAP SERPL CALC-SCNC: 13 MMOL/L (ref 7–16)
APPEARANCE UR: CLEAR
AST SERPL-CCNC: 39 U/L (ref 12–45)
BASOPHILS # BLD AUTO: 0.7 % (ref 0–1.8)
BASOPHILS # BLD: 0.05 K/UL (ref 0–0.12)
BILIRUB SERPL-MCNC: 0.5 MG/DL (ref 0.1–1.5)
BILIRUB UR QL STRIP.AUTO: NEGATIVE
BUN SERPL-MCNC: 12 MG/DL (ref 8–22)
CALCIUM ALBUM COR SERPL-MCNC: 9.2 MG/DL (ref 8.5–10.5)
CALCIUM SERPL-MCNC: 9.8 MG/DL (ref 8.5–10.5)
CHLORIDE SERPL-SCNC: 102 MMOL/L (ref 96–112)
CO2 SERPL-SCNC: 24 MMOL/L (ref 20–33)
COLOR UR: YELLOW
CREAT SERPL-MCNC: 0.9 MG/DL (ref 0.5–1.4)
CREAT UR-MCNC: 93.55 MG/DL
EOSINOPHIL # BLD AUTO: 0.15 K/UL (ref 0–0.51)
EOSINOPHIL NFR BLD: 2.2 % (ref 0–6.9)
ERYTHROCYTE [DISTWIDTH] IN BLOOD BY AUTOMATED COUNT: 41 FL (ref 35.9–50)
EST. AVERAGE GLUCOSE BLD GHB EST-MCNC: 123 MG/DL
GFR SERPLBLD CREATININE-BSD FMLA CKD-EPI: 101 ML/MIN/1.73 M 2
GLOBULIN SER CALC-MCNC: 3.2 G/DL (ref 1.9–3.5)
GLUCOSE SERPL-MCNC: 89 MG/DL (ref 65–99)
GLUCOSE UR STRIP.AUTO-MCNC: NEGATIVE MG/DL
HBA1C MFR BLD: 5.9 % (ref 4–5.6)
HCT VFR BLD AUTO: 41.7 % (ref 42–52)
HGB BLD-MCNC: 14.1 G/DL (ref 14–18)
IMM GRANULOCYTES # BLD AUTO: 0.01 K/UL (ref 0–0.11)
IMM GRANULOCYTES NFR BLD AUTO: 0.1 % (ref 0–0.9)
KETONES UR STRIP.AUTO-MCNC: NEGATIVE MG/DL
LEUKOCYTE ESTERASE UR QL STRIP.AUTO: NEGATIVE
LYMPHOCYTES # BLD AUTO: 2.13 K/UL (ref 1–4.8)
LYMPHOCYTES NFR BLD: 31.7 % (ref 22–41)
MCH RBC QN AUTO: 29.4 PG (ref 27–33)
MCHC RBC AUTO-ENTMCNC: 33.8 G/DL (ref 32.3–36.5)
MCV RBC AUTO: 86.9 FL (ref 81.4–97.8)
MICRO URNS: NORMAL
MICROALBUMIN UR-MCNC: <1.2 MG/DL
MICROALBUMIN/CREAT UR: NORMAL MG/G (ref 0–30)
MONOCYTES # BLD AUTO: 0.49 K/UL (ref 0–0.85)
MONOCYTES NFR BLD AUTO: 7.3 % (ref 0–13.4)
NEUTROPHILS # BLD AUTO: 3.89 K/UL (ref 1.82–7.42)
NEUTROPHILS NFR BLD: 58 % (ref 44–72)
NITRITE UR QL STRIP.AUTO: NEGATIVE
NRBC # BLD AUTO: 0 K/UL
NRBC BLD-RTO: 0 /100 WBC (ref 0–0.2)
PH UR STRIP.AUTO: 5.5 [PH] (ref 5–8)
PLATELET # BLD AUTO: 256 K/UL (ref 164–446)
PMV BLD AUTO: 10.4 FL (ref 9–12.9)
POTASSIUM SERPL-SCNC: 4 MMOL/L (ref 3.6–5.5)
PROT SERPL-MCNC: 8 G/DL (ref 6–8.2)
PROT UR QL STRIP: NEGATIVE MG/DL
PSA SERPL-MCNC: 0.33 NG/ML (ref 0–4)
RBC # BLD AUTO: 4.8 M/UL (ref 4.7–6.1)
RBC UR QL AUTO: NEGATIVE
SODIUM SERPL-SCNC: 139 MMOL/L (ref 135–145)
SP GR UR STRIP.AUTO: 1.01
T3FREE SERPL-MCNC: 3.59 PG/ML (ref 2–4.4)
TESTOST SERPL-MCNC: 158 NG/DL (ref 175–781)
TSH SERPL DL<=0.005 MIU/L-ACNC: 2.53 UIU/ML (ref 0.38–5.33)
UROBILINOGEN UR STRIP.AUTO-MCNC: 0.2 MG/DL
WBC # BLD AUTO: 6.7 K/UL (ref 4.8–10.8)

## 2023-11-27 PROCEDURE — 85025 COMPLETE CBC W/AUTO DIFF WBC: CPT

## 2023-11-27 PROCEDURE — 84153 ASSAY OF PSA TOTAL: CPT

## 2023-11-27 PROCEDURE — 81003 URINALYSIS AUTO W/O SCOPE: CPT

## 2023-11-27 PROCEDURE — 83704 LIPOPROTEIN BLD QUAN PART: CPT

## 2023-11-27 PROCEDURE — 84402 ASSAY OF FREE TESTOSTERONE: CPT

## 2023-11-27 PROCEDURE — 82570 ASSAY OF URINE CREATININE: CPT

## 2023-11-27 PROCEDURE — 82306 VITAMIN D 25 HYDROXY: CPT

## 2023-11-27 PROCEDURE — 84443 ASSAY THYROID STIM HORMONE: CPT

## 2023-11-27 PROCEDURE — 84270 ASSAY OF SEX HORMONE GLOBUL: CPT

## 2023-11-27 PROCEDURE — 83036 HEMOGLOBIN GLYCOSYLATED A1C: CPT

## 2023-11-27 PROCEDURE — 80061 LIPID PANEL: CPT

## 2023-11-27 PROCEDURE — 84403 ASSAY OF TOTAL TESTOSTERONE: CPT

## 2023-11-27 PROCEDURE — 80053 COMPREHEN METABOLIC PANEL: CPT

## 2023-11-27 PROCEDURE — 82043 UR ALBUMIN QUANTITATIVE: CPT

## 2023-11-27 PROCEDURE — 84481 FREE ASSAY (FT-3): CPT

## 2023-11-27 PROCEDURE — 36415 COLL VENOUS BLD VENIPUNCTURE: CPT

## 2023-11-27 PROCEDURE — 84439 ASSAY OF FREE THYROXINE: CPT

## 2023-11-29 LAB
SHBG SERPL-SCNC: 21 NMOL/L (ref 19–76)
TESTOST FREE MFR SERPL: 2.1 % (ref 1.6–2.9)
TESTOST FREE SERPL-MCNC: 27 PG/ML (ref 47–244)
TESTOST SERPL-MCNC: 129 NG/DL (ref 300–890)

## 2023-11-30 LAB — T4 FREE SERPL DIALY-MCNC: 1.1 NG/DL (ref 1.1–2.4)

## 2023-12-01 LAB
CHOLEST SERPL-MCNC: 101 MG/DL
HDL PARTICAL NO Q4363: 34 UMOL/L
HDL SIZE Q4361: 8.7 NM
HDLC SERPL-MCNC: 39 MG/DL (ref 40–59)
HLD.LARGE SERPL-SCNC: 3.1 UMOL/L
L VLDL PART NO Q4357: 8.9 NMOL/L
LDL SERPL QN: 20.6 NM
LDL SERPL-SCNC: 613 NMOL/L
LDL SMALL SERPL-SCNC: 405 NMOL/L
LDLC SERPL CALC-MCNC: 30 MG/DL
PATHOLOGY STUDY: ABNORMAL
TRIGL SERPL-MCNC: 162 MG/DL (ref 30–149)
VLDL SIZE Q4362: 54.6 NM